# Patient Record
Sex: FEMALE | Race: BLACK OR AFRICAN AMERICAN | Employment: FULL TIME
[De-identification: names, ages, dates, MRNs, and addresses within clinical notes are randomized per-mention and may not be internally consistent; named-entity substitution may affect disease eponyms.]

---

## 2023-06-18 ENCOUNTER — HOSPITAL ENCOUNTER (EMERGENCY)
Facility: HOSPITAL | Age: 33
Discharge: HOME | End: 2023-06-18
Payer: MEDICAID

## 2023-06-18 VITALS
BODY MASS INDEX: 22.15 KG/M2 | SYSTOLIC BLOOD PRESSURE: 115 MMHG | HEART RATE: 75 BPM | TEMPERATURE: 98.2 F | OXYGEN SATURATION: 100 % | HEIGHT: 63 IN | WEIGHT: 125 LBS | RESPIRATION RATE: 16 BRPM | DIASTOLIC BLOOD PRESSURE: 70 MMHG

## 2023-06-18 DIAGNOSIS — M79.10 MUSCLE PAIN: Primary | ICD-10-CM

## 2023-06-18 LAB
ALBUMIN SERPL-MCNC: 4.3 G/DL (ref 3.5–5)
ALBUMIN/GLOB SERPL: 1.3 (ref 1.1–2.2)
ALP SERPL-CCNC: 53 U/L (ref 45–117)
ALT SERPL-CCNC: 25 U/L (ref 12–78)
ANION GAP SERPL CALC-SCNC: 11 MMOL/L (ref 5–15)
APPEARANCE UR: CLEAR
AST SERPL W P-5'-P-CCNC: 16 U/L (ref 15–37)
BACTERIA URNS QL MICRO: NEGATIVE /HPF
BASOPHILS # BLD: 0 K/UL (ref 0–0.1)
BASOPHILS NFR BLD: 1 % (ref 0–1)
BILIRUB SERPL-MCNC: 0.8 MG/DL (ref 0.2–1)
BILIRUB UR QL: NEGATIVE
BUN SERPL-MCNC: 9 MG/DL (ref 6–20)
BUN/CREAT SERPL: 11 (ref 12–20)
CA-I BLD-MCNC: 9.4 MG/DL (ref 8.5–10.1)
CHLORIDE SERPL-SCNC: 103 MMOL/L (ref 97–108)
CK SERPL-CCNC: 84 U/L (ref 26–192)
CO2 SERPL-SCNC: 26 MMOL/L (ref 21–32)
COLOR UR: YELLOW
CREAT SERPL-MCNC: 0.8 MG/DL (ref 0.55–1.02)
DIFFERENTIAL METHOD BLD: ABNORMAL
EOSINOPHIL # BLD: 0.2 K/UL (ref 0–0.4)
EOSINOPHIL NFR BLD: 3 % (ref 0–7)
EPITH CASTS URNS QL MICRO: ABNORMAL /LPF
ERYTHROCYTE [DISTWIDTH] IN BLOOD BY AUTOMATED COUNT: 12.2 % (ref 11.5–14.5)
GLOBULIN SER CALC-MCNC: 3.3 G/DL (ref 2–4)
GLUCOSE SERPL-MCNC: 97 MG/DL (ref 65–100)
GLUCOSE UR STRIP.AUTO-MCNC: NEGATIVE MG/DL
HCG UR QL: NEGATIVE
HCT VFR BLD AUTO: 35.6 % (ref 35–47)
HGB BLD-MCNC: 12 G/DL (ref 11.5–16)
HGB UR QL STRIP: ABNORMAL
IMM GRANULOCYTES # BLD AUTO: 0 K/UL (ref 0–0.04)
IMM GRANULOCYTES NFR BLD AUTO: 0 % (ref 0–0.5)
KETONES UR QL STRIP.AUTO: 15 MG/DL
LEUKOCYTE ESTERASE UR QL STRIP.AUTO: ABNORMAL
LYMPHOCYTES # BLD: 2.9 K/UL (ref 0.8–3.5)
LYMPHOCYTES NFR BLD: 60 % (ref 12–49)
MAGNESIUM SERPL-MCNC: 1.8 MG/DL (ref 1.6–2.4)
MCH RBC QN AUTO: 31.7 PG (ref 26–34)
MCHC RBC AUTO-ENTMCNC: 33.7 G/DL (ref 30–36.5)
MCV RBC AUTO: 94.2 FL (ref 80–99)
MONOCYTES # BLD: 0.3 K/UL (ref 0–1)
MONOCYTES NFR BLD: 5 % (ref 5–13)
MUCOUS THREADS URNS QL MICRO: ABNORMAL /LPF
NEUTS SEG # BLD: 1.5 K/UL (ref 1.8–8)
NEUTS SEG NFR BLD: 31 % (ref 32–75)
NITRITE UR QL STRIP.AUTO: NEGATIVE
PH UR STRIP: 6.5 (ref 5–8)
PLATELET # BLD AUTO: 272 K/UL (ref 150–400)
PMV BLD AUTO: 9.6 FL (ref 8.9–12.9)
POTASSIUM SERPL-SCNC: 3.1 MMOL/L (ref 3.5–5.1)
PROT SERPL-MCNC: 7.6 G/DL (ref 6.4–8.2)
PROT UR STRIP-MCNC: NEGATIVE MG/DL
RBC # BLD AUTO: 3.78 M/UL (ref 3.8–5.2)
RBC #/AREA URNS HPF: ABNORMAL /HPF (ref 0–5)
SODIUM SERPL-SCNC: 140 MMOL/L (ref 136–145)
SP GR UR REFRACTOMETRY: 1.01 (ref 1–1.03)
URINE CULTURE IF INDICATED: ABNORMAL
UROBILINOGEN UR QL STRIP.AUTO: 0.1 EU/DL (ref 0.2–1)
WBC # BLD AUTO: 4.8 K/UL (ref 3.6–11)
WBC URNS QL MICRO: ABNORMAL /HPF (ref 0–4)

## 2023-06-18 PROCEDURE — 85025 COMPLETE CBC W/AUTO DIFF WBC: CPT

## 2023-06-18 PROCEDURE — 83735 ASSAY OF MAGNESIUM: CPT

## 2023-06-18 PROCEDURE — 6370000000 HC RX 637 (ALT 250 FOR IP): Performed by: NURSE PRACTITIONER

## 2023-06-18 PROCEDURE — 80053 COMPREHEN METABOLIC PANEL: CPT

## 2023-06-18 PROCEDURE — 82550 ASSAY OF CK (CPK): CPT

## 2023-06-18 PROCEDURE — 2580000003 HC RX 258: Performed by: NURSE PRACTITIONER

## 2023-06-18 PROCEDURE — 99284 EMERGENCY DEPT VISIT MOD MDM: CPT

## 2023-06-18 PROCEDURE — 81025 URINE PREGNANCY TEST: CPT

## 2023-06-18 PROCEDURE — 81001 URINALYSIS AUTO W/SCOPE: CPT

## 2023-06-18 PROCEDURE — 93005 ELECTROCARDIOGRAM TRACING: CPT | Performed by: NURSE PRACTITIONER

## 2023-06-18 PROCEDURE — 36415 COLL VENOUS BLD VENIPUNCTURE: CPT

## 2023-06-18 RX ORDER — 0.9 % SODIUM CHLORIDE 0.9 %
1000 INTRAVENOUS SOLUTION INTRAVENOUS
Status: COMPLETED | OUTPATIENT
Start: 2023-06-18 | End: 2023-06-18

## 2023-06-18 RX ORDER — POTASSIUM CHLORIDE 1.5 G/1.77G
40 POWDER, FOR SOLUTION ORAL ONCE
Status: COMPLETED | OUTPATIENT
Start: 2023-06-18 | End: 2023-06-18

## 2023-06-18 RX ADMIN — SODIUM CHLORIDE 1000 ML: 9 INJECTION, SOLUTION INTRAVENOUS at 16:15

## 2023-06-18 RX ADMIN — SODIUM CHLORIDE 1000 ML: 9 INJECTION, SOLUTION INTRAVENOUS at 17:04

## 2023-06-18 RX ADMIN — POTASSIUM CHLORIDE 40 MEQ: 1.5 FOR SOLUTION ORAL at 17:04

## 2023-06-18 ASSESSMENT — PAIN SCALES - GENERAL: PAINLEVEL_OUTOF10: 7

## 2023-06-18 ASSESSMENT — PAIN - FUNCTIONAL ASSESSMENT: PAIN_FUNCTIONAL_ASSESSMENT: 0-10

## 2023-06-19 LAB
EKG ATRIAL RATE: 93 BPM
EKG DIAGNOSIS: NORMAL
EKG P AXIS: 57 DEGREES
EKG P-R INTERVAL: 172 MS
EKG Q-T INTERVAL: 364 MS
EKG QRS DURATION: 72 MS
EKG QTC CALCULATION (BAZETT): 452 MS
EKG R AXIS: 65 DEGREES
EKG T AXIS: 12 DEGREES
EKG VENTRICULAR RATE: 93 BPM

## 2023-06-29 ENCOUNTER — TELEPHONE (OUTPATIENT)
Age: 33
End: 2023-06-29

## 2024-01-11 ENCOUNTER — HOSPITAL ENCOUNTER (EMERGENCY)
Facility: HOSPITAL | Age: 34
Discharge: HOME | End: 2024-01-11
Payer: MEDICAID

## 2024-01-11 ENCOUNTER — APPOINTMENT (OUTPATIENT)
Facility: HOSPITAL | Age: 34
End: 2024-01-11
Payer: MEDICAID

## 2024-01-11 VITALS
TEMPERATURE: 98.9 F | WEIGHT: 138.4 LBS | SYSTOLIC BLOOD PRESSURE: 126 MMHG | HEIGHT: 63 IN | OXYGEN SATURATION: 98 % | DIASTOLIC BLOOD PRESSURE: 71 MMHG | HEART RATE: 89 BPM | BODY MASS INDEX: 24.52 KG/M2

## 2024-01-11 DIAGNOSIS — M62.831 MUSCLE SPASM OF LEFT CALF: Primary | ICD-10-CM

## 2024-01-11 DIAGNOSIS — Z86.16 HISTORY OF COVID-19: ICD-10-CM

## 2024-01-11 DIAGNOSIS — R07.89 CHEST WALL PAIN: ICD-10-CM

## 2024-01-11 LAB
ALBUMIN SERPL-MCNC: 4.1 G/DL (ref 3.5–5)
ALBUMIN/GLOB SERPL: 1.2 (ref 1.1–2.2)
ALP SERPL-CCNC: 47 U/L (ref 45–117)
ALT SERPL-CCNC: 28 U/L (ref 12–78)
ANION GAP SERPL CALC-SCNC: 8 MMOL/L (ref 5–15)
AST SERPL W P-5'-P-CCNC: 14 U/L (ref 15–37)
BASOPHILS # BLD: 0.1 K/UL (ref 0–0.1)
BASOPHILS NFR BLD: 1 % (ref 0–1)
BILIRUB SERPL-MCNC: 0.4 MG/DL (ref 0.2–1)
BUN SERPL-MCNC: 15 MG/DL (ref 6–20)
BUN/CREAT SERPL: 21 (ref 12–20)
CA-I BLD-MCNC: 9.4 MG/DL (ref 8.5–10.1)
CHLORIDE SERPL-SCNC: 102 MMOL/L (ref 97–108)
CK SERPL-CCNC: 84 U/L (ref 26–192)
CO2 SERPL-SCNC: 29 MMOL/L (ref 21–32)
CREAT SERPL-MCNC: 0.72 MG/DL (ref 0.55–1.02)
D DIMER PPP FEU-MCNC: 0.58 MG/L FEU (ref 0.19–0.5)
DIFFERENTIAL METHOD BLD: ABNORMAL
EKG ATRIAL RATE: 68 BPM
EKG DIAGNOSIS: NORMAL
EKG P AXIS: 66 DEGREES
EKG P-R INTERVAL: 188 MS
EKG Q-T INTERVAL: 392 MS
EKG QRS DURATION: 78 MS
EKG QTC CALCULATION (BAZETT): 416 MS
EKG R AXIS: 57 DEGREES
EKG T AXIS: 18 DEGREES
EKG VENTRICULAR RATE: 68 BPM
EOSINOPHIL # BLD: 0.2 K/UL (ref 0–0.4)
EOSINOPHIL NFR BLD: 4 % (ref 0–7)
ERYTHROCYTE [DISTWIDTH] IN BLOOD BY AUTOMATED COUNT: 12.2 % (ref 11.5–14.5)
GLOBULIN SER CALC-MCNC: 3.3 G/DL (ref 2–4)
GLUCOSE SERPL-MCNC: 89 MG/DL (ref 65–100)
HCT VFR BLD AUTO: 36.8 % (ref 35–47)
HGB BLD-MCNC: 12.3 G/DL (ref 11.5–16)
IMM GRANULOCYTES # BLD AUTO: 0 K/UL (ref 0–0.04)
IMM GRANULOCYTES NFR BLD AUTO: 0 % (ref 0–0.5)
LYMPHOCYTES # BLD: 2.8 K/UL (ref 0.8–3.5)
LYMPHOCYTES NFR BLD: 57 % (ref 12–49)
MAGNESIUM SERPL-MCNC: 1.7 MG/DL (ref 1.6–2.4)
MCH RBC QN AUTO: 31.6 PG (ref 26–34)
MCHC RBC AUTO-ENTMCNC: 33.4 G/DL (ref 30–36.5)
MCV RBC AUTO: 94.6 FL (ref 80–99)
MONOCYTES # BLD: 0.2 K/UL (ref 0–1)
MONOCYTES NFR BLD: 4 % (ref 5–13)
NEUTS SEG # BLD: 1.6 K/UL (ref 1.8–8)
NEUTS SEG NFR BLD: 34 % (ref 32–75)
PLATELET # BLD AUTO: 303 K/UL (ref 150–400)
PMV BLD AUTO: 9.2 FL (ref 8.9–12.9)
POTASSIUM SERPL-SCNC: 3.9 MMOL/L (ref 3.5–5.1)
PROT SERPL-MCNC: 7.4 G/DL (ref 6.4–8.2)
RBC # BLD AUTO: 3.89 M/UL (ref 3.8–5.2)
SODIUM SERPL-SCNC: 139 MMOL/L (ref 136–145)
TROPONIN I SERPL HS-MCNC: 6 NG/L (ref 0–51)
WBC # BLD AUTO: 4.8 K/UL (ref 3.6–11)

## 2024-01-11 PROCEDURE — 85025 COMPLETE CBC W/AUTO DIFF WBC: CPT

## 2024-01-11 PROCEDURE — 83735 ASSAY OF MAGNESIUM: CPT

## 2024-01-11 PROCEDURE — 93005 ELECTROCARDIOGRAM TRACING: CPT | Performed by: EMERGENCY MEDICINE

## 2024-01-11 PROCEDURE — 6360000004 HC RX CONTRAST MEDICATION

## 2024-01-11 PROCEDURE — 84484 ASSAY OF TROPONIN QUANT: CPT

## 2024-01-11 PROCEDURE — 80053 COMPREHEN METABOLIC PANEL: CPT

## 2024-01-11 PROCEDURE — 85379 FIBRIN DEGRADATION QUANT: CPT

## 2024-01-11 PROCEDURE — 99285 EMERGENCY DEPT VISIT HI MDM: CPT

## 2024-01-11 PROCEDURE — 82550 ASSAY OF CK (CPK): CPT

## 2024-01-11 PROCEDURE — 36415 COLL VENOUS BLD VENIPUNCTURE: CPT

## 2024-01-11 PROCEDURE — 71275 CT ANGIOGRAPHY CHEST: CPT

## 2024-01-11 RX ADMIN — IOPAMIDOL 100 ML: 755 INJECTION, SOLUTION INTRAVENOUS at 20:11

## 2024-01-11 ASSESSMENT — PAIN DESCRIPTION - ORIENTATION: ORIENTATION: LEFT

## 2024-01-11 ASSESSMENT — PAIN DESCRIPTION - LOCATION: LOCATION: SHOULDER;LEG

## 2024-01-11 ASSESSMENT — PAIN - FUNCTIONAL ASSESSMENT: PAIN_FUNCTIONAL_ASSESSMENT: 0-10

## 2024-01-11 ASSESSMENT — PAIN SCALES - GENERAL: PAINLEVEL_OUTOF10: 6

## 2024-01-11 NOTE — ED TRIAGE NOTES
Dx with covid last tues, is feeling better from that but since then has had left leg cramping, chest pain and back pain. Works at [employer] [facility] and had bloodwork done today, d-dimer was 0.59

## 2024-01-11 NOTE — ED PROVIDER NOTES
[facility] EMERGENCY DEPT  EMERGENCY DEPARTMENT HISTORY AND PHYSICAL EXAM      Date: 2024  Patient Name: Landy Kim  MRN: [id number]  YOB: 1990  Date of evaluation: 2024  Provider: Tim Molina PA-C   Note Started: 6:36 PM EST 24    HISTORY OF PRESENT ILLNESS     Chief Complaint   Patient presents with    Back Pain    Chest Pain    Leg Pain       History Provided By: Patient    HPI: Landy Kim is a 33 y.o. female past medical history past medical history listed below presents from today with complaint of chest pain starting 2 days ago.  Reports having COVID last week.  Noticed pain in the left lower extremity.  Reports family history of DVT.  Comes in today for further evaluation.  Reports intermittent palpitations over the past couple days.  Denies any abdominal pain, nausea, vomiting.  Denies any shortness of breath or wheezing.  Denies congestion or sore throat.  Reports that he did not manage states that it was elevated.    PAST MEDICAL HISTORY   Past Medical History:  Past Medical History:   Diagnosis Date    Arnold-Chiari malformation (HCC)     Endometriosis     GERD (gastroesophageal reflux disease)     Hashimoto's disease     KEYUR (obstructive sleep apnea)     Scoliosis     Seasonal allergic rhinitis        Past Surgical History:  Past Surgical History:   Procedure Laterality Date    APPENDECTOMY       SECTION      TONSILLECTOMY         Family History:  Family History   Problem Relation Age of Onset    Lupus Mother     Cancer Other         father's uncles - stomach cancer       Social History:  Social History     Tobacco Use    Smoking status: Former     Current packs/day: 0.25     Types: Cigarettes    Smokeless tobacco: Never   Substance Use Topics    Alcohol use: Not Currently    Drug use: Never       Allergies:  Allergies   Allergen Reactions    Barley Grass Other (See Comments)     Swelling     Shellfish Allergy      Other reaction(s): Unknown (comments)    Soy

## 2024-01-12 ENCOUNTER — HOSPITAL ENCOUNTER (EMERGENCY)
Facility: HOSPITAL | Age: 34
Discharge: HOME | End: 2024-01-12
Payer: MEDICAID

## 2024-01-12 ENCOUNTER — APPOINTMENT (OUTPATIENT)
Facility: HOSPITAL | Age: 34
End: 2024-01-12
Attending: STUDENT IN AN ORGANIZED HEALTH CARE EDUCATION/TRAINING PROGRAM
Payer: MEDICAID

## 2024-01-12 VITALS
OXYGEN SATURATION: 100 % | SYSTOLIC BLOOD PRESSURE: 131 MMHG | TEMPERATURE: 98.8 F | RESPIRATION RATE: 18 BRPM | DIASTOLIC BLOOD PRESSURE: 76 MMHG | HEART RATE: 72 BPM

## 2024-01-12 DIAGNOSIS — M79.606 LEG PAIN: Primary | ICD-10-CM

## 2024-01-12 DIAGNOSIS — M79.605 BILATERAL LEG PAIN: ICD-10-CM

## 2024-01-12 DIAGNOSIS — M79.604 BILATERAL LEG PAIN: ICD-10-CM

## 2024-01-12 PROCEDURE — 99284 EMERGENCY DEPT VISIT MOD MDM: CPT

## 2024-01-12 PROCEDURE — 93970 EXTREMITY STUDY: CPT

## 2024-01-12 RX ORDER — METHOCARBAMOL 750 MG/1
750 TABLET, FILM COATED ORAL
Qty: 10 TABLET | Refills: 0 | Status: SHIPPED | OUTPATIENT
Start: 2024-01-12 | End: 2024-01-22

## 2024-01-12 ASSESSMENT — LIFESTYLE VARIABLES
HOW MANY STANDARD DRINKS CONTAINING ALCOHOL DO YOU HAVE ON A TYPICAL DAY: PATIENT DOES NOT DRINK
HOW OFTEN DO YOU HAVE A DRINK CONTAINING ALCOHOL: NEVER

## 2024-01-12 ASSESSMENT — PAIN SCALES - GENERAL: PAINLEVEL_OUTOF10: 5

## 2024-01-12 NOTE — ED TRIAGE NOTES
Pt presents for leg pain that is worse when her legs are elevated. Seen at [facility] last night with positive DDimer and negative CTA, advised to f/u here for duplex. Positive for covid x2wk ago

## 2024-01-12 NOTE — DISCHARGE INSTRUCTIONS
Thank you!  Thank you for allowing me to care for you in the emergency department. It is my goal to provide you with excellent care. If you have not received excellent quality care, please ask to speak to the nurse manager. Please fill out the survey that will come to you by mail or email since we listen to your feedback!     Below you will find a list of your tests from today's visit.  Should you have any questions, please do not hesitate to call the emergency department.    Labs  Recent Results (from the past 12 hour(s))   CBC with Auto Differential    Collection Time: 01/11/24  6:30 PM   Result Value Ref Range    WBC 4.8 3.6 - 11.0 K/uL    RBC 3.89 3.80 - 5.20 M/uL    Hemoglobin 12.3 11.5 - 16.0 g/dL    Hematocrit 36.8 35.0 - 47.0 %    MCV 94.6 80.0 - 99.0 FL    MCH 31.6 26.0 - 34.0 PG    MCHC 33.4 30.0 - 36.5 g/dL    RDW 12.2 11.5 - 14.5 %    Platelets 303 150 - 400 K/uL    MPV 9.2 8.9 - 12.9 FL    Neutrophils % 34 32 - 75 %    Lymphocytes % 57 (H) 12 - 49 %    Monocytes % 4 (L) 5 - 13 %    Eosinophils % 4 0 - 7 %    Basophils % 1 0 - 1 %    Immature Granulocytes 0 0.0 - 0.5 %    Neutrophils Absolute 1.6 (L) 1.8 - 8.0 K/UL    Lymphocytes Absolute 2.8 0.8 - 3.5 K/UL    Monocytes Absolute 0.2 0.0 - 1.0 K/UL    Eosinophils Absolute 0.2 0.0 - 0.4 K/UL    Basophils Absolute 0.1 0.0 - 0.1 K/UL    Absolute Immature Granulocyte 0.0 0.00 - 0.04 K/UL    Differential Type AUTOMATED     Comprehensive Metabolic Panel    Collection Time: 01/11/24  6:30 PM   Result Value Ref Range    Sodium 139 136 - 145 mmol/L    Potassium 3.9 3.5 - 5.1 mmol/L    Chloride 102 97 - 108 mmol/L    CO2 29 21 - 32 mmol/L    Anion Gap 8 5 - 15 mmol/L    Glucose 89 65 - 100 mg/dL    BUN 15 6 - 20 mg/dL    Creatinine 0.72 0.55 - 1.02 mg/dL    Bun/Cre Ratio 21 (H) 12 - 20      Est, Glom Filt Rate >60 >60 ml/min/1.73m2    Calcium 9.4 8.5 - 10.1 mg/dL    Total Bilirubin 0.4 0.2 - 1.0 mg/dL    AST 14 (L) 15 - 37 U/L    ALT 28 12 - 78 U/L    Alk

## 2024-01-13 NOTE — ED PROVIDER NOTES
[facility] EMERGENCY DEPT  EMERGENCY DEPARTMENT HISTORY AND PHYSICAL EXAM      Date: 2024  Patient Name: Landy Kim  MRN: [id number]  YOB: 1990  Date of evaluation: 2024  Provider: Joelle Conroy PA-C   Note Started: 8:20 PM EST 24    HISTORY OF PRESENT ILLNESS     Chief Complaint   Patient presents with    Leg Pain    Positive For Covid-19       History Provided By: Patient    HPI: Landy Kim is a 33 y.o. female with past medical history listed below, presents for bilateral lower leg pain after positive COVID test. Patient states that about 2 weeks ago tested positive for COVID.  Majority of her symptoms resolved, however has had some bilateral lower leg pain.  Patient was seen yesterday at [facility] emergency department where she had full workup due to elevated D-dimer, back and chest pain which has now resolved.  CTA was negative.  She was told that if she continued to have leg pain that she should come to [facility] for duplex. Denies any fevers, chills, shortness of breath, difficulty breathing, nausea/vomiting, constipation/diarrhea, abdominal pain, rash, night sweats, or chest pain.     PAST MEDICAL HISTORY   Past Medical History:  Past Medical History:   Diagnosis Date    Arnold-Chiari malformation (HCC)     Endometriosis     GERD (gastroesophageal reflux disease)     Hashimoto's disease     KEYUR (obstructive sleep apnea)     Scoliosis     Seasonal allergic rhinitis        Past Surgical History:  Past Surgical History:   Procedure Laterality Date    APPENDECTOMY       SECTION      TONSILLECTOMY         Family History:  Family History   Problem Relation Age of Onset    Lupus Mother     Cancer Other         father's uncles - stomach cancer       Social History:  Social History     Tobacco Use    Smoking status: Former     Current packs/day: 0.25     Types: Cigarettes    Smokeless tobacco: Never   Substance Use Topics    Alcohol use: Not Currently    Drug use: Never  is stable for discharge home. The signs, symptoms, diagnosis, and discharge instructions have been discussed, understanding conveyed, and agreed upon. The patient is to follow up as recommended or return to ER should their symptoms worsen.      PATIENT REFERRED TO:  Travis Brandt MD  [address] [address] [phone]              DISCHARGE MEDICATIONS:     Medication List        START taking these medications      methocarbamol 750 MG tablet  Commonly known as: Robaxin-750  Take 1 tablet by mouth nightly for 10 days            ASK your doctor about these medications      acetaminophen 325 MG tablet  Commonly known as: TYLENOL     ibuprofen 600 MG tablet  Commonly known as: ADVIL;MOTRIN     montelukast 10 MG tablet  Commonly known as: SINGULAIR     omeprazole 20 MG delayed release capsule  Commonly known as: PRILOSEC     ondansetron 4 MG disintegrating tablet  Commonly known as: ZOFRAN-ODT     * SUMAtriptan 100 MG tablet  Commonly known as: IMITREX     * SUMAtriptan Succinate Refill 6 MG/0.5ML Soct     ZyrTEC Allergy 10 MG Caps  Generic drug: Cetirizine HCl           * This list has 2 medication(s) that are the same as other medications prescribed for you. Read the directions carefully, and ask your doctor or other care provider to review them with you.                   Where to Get Your Medications        These medications were sent to [address] [phone] - F [phone]  [address] [place] [id number]      Phone: [phone]   methocarbamol 750 MG tablet           DISCONTINUED MEDICATIONS:  Discharge Medication List as of 1/12/2024  6:32 PM          I am the Primary Clinician of Record: Joelle Conroy PA-C (electronically signed)    (Please note that parts of this dictation were completed with voice recognition software. Quite often unanticipated grammatical, syntax, homophones, and other interpretive errors

## 2024-04-29 ENCOUNTER — APPOINTMENT (OUTPATIENT)
Facility: HOSPITAL | Age: 34
End: 2024-04-29
Payer: MEDICAID

## 2024-04-29 ENCOUNTER — HOSPITAL ENCOUNTER (EMERGENCY)
Facility: HOSPITAL | Age: 34
Discharge: HOME | End: 2024-04-29
Attending: EMERGENCY MEDICINE
Payer: MEDICAID

## 2024-04-29 VITALS
HEART RATE: 93 BPM | BODY MASS INDEX: 26.22 KG/M2 | TEMPERATURE: 98.2 F | OXYGEN SATURATION: 100 % | DIASTOLIC BLOOD PRESSURE: 64 MMHG | WEIGHT: 148 LBS | RESPIRATION RATE: 16 BRPM | HEIGHT: 63 IN | SYSTOLIC BLOOD PRESSURE: 121 MMHG

## 2024-04-29 DIAGNOSIS — Z23 NEED FOR TETANUS BOOSTER: ICD-10-CM

## 2024-04-29 DIAGNOSIS — S61.411A LACERATION OF RIGHT HAND WITHOUT FOREIGN BODY, INITIAL ENCOUNTER: Primary | ICD-10-CM

## 2024-04-29 PROCEDURE — 90471 IMMUNIZATION ADMIN: CPT

## 2024-04-29 PROCEDURE — 99284 EMERGENCY DEPT VISIT MOD MDM: CPT

## 2024-04-29 PROCEDURE — 12001 RPR S/N/AX/GEN/TRNK 2.5CM/<: CPT

## 2024-04-29 PROCEDURE — 6370000000 HC RX 637 (ALT 250 FOR IP)

## 2024-04-29 PROCEDURE — 73130 X-RAY EXAM OF HAND: CPT

## 2024-04-29 PROCEDURE — 6360000002 HC RX W HCPCS

## 2024-04-29 PROCEDURE — 90714 TD VACC NO PRESV 7 YRS+ IM: CPT

## 2024-04-29 RX ORDER — PANTOPRAZOLE SODIUM 40 MG/1
TABLET, DELAYED RELEASE ORAL
COMMUNITY
Start: 2023-09-12

## 2024-04-29 RX ORDER — TETANUS AND DIPHTHERIA TOXOIDS ADSORBED 2; 2 [LF]/.5ML; [LF]/.5ML
0.5 INJECTION INTRAMUSCULAR ONCE
Status: COMPLETED | OUTPATIENT
Start: 2024-04-29 | End: 2024-04-29

## 2024-04-29 RX ORDER — IBUPROFEN 800 MG/1
800 TABLET ORAL
Status: COMPLETED | OUTPATIENT
Start: 2024-04-29 | End: 2024-04-29

## 2024-04-29 RX ORDER — FLUOXETINE 10 MG/1
CAPSULE ORAL
COMMUNITY
Start: 2023-06-29

## 2024-04-29 RX ORDER — LEVOTHYROXINE SODIUM 0.03 MG/1
TABLET ORAL
COMMUNITY
Start: 2024-04-10

## 2024-04-29 RX ADMIN — TETANUS AND DIPHTHERIA TOXOIDS ADSORBED 0.5 ML: 2; 2 INJECTION INTRAMUSCULAR at 20:28

## 2024-04-29 RX ADMIN — IBUPROFEN 800 MG: 800 TABLET, FILM COATED ORAL at 20:28

## 2024-04-29 ASSESSMENT — PAIN DESCRIPTION - ORIENTATION: ORIENTATION: RIGHT

## 2024-04-29 ASSESSMENT — PAIN SCALES - GENERAL: PAINLEVEL_OUTOF10: 10

## 2024-04-29 ASSESSMENT — PAIN DESCRIPTION - LOCATION: LOCATION: HAND

## 2024-04-29 ASSESSMENT — PAIN - FUNCTIONAL ASSESSMENT: PAIN_FUNCTIONAL_ASSESSMENT: 0-10

## 2024-04-30 NOTE — DISCHARGE INSTRUCTIONS
Thank you!  Thank you for allowing me to care for you in the emergency department. It is my goal to provide you with excellent care.  Please fill out the survey that will come to you by mail or email since we listen to your feedback!     Below you will find a list of your tests from today's visit.  Should you have any questions, please do not hesitate to call the emergency department.    Labs  No results found for this or any previous visit (from the past 12 hour(s)).    Radiologic Studies  XR HAND RIGHT (MIN 3 VIEWS)   Final Result   No acute bony abnormalities.           ------------------------------------------------------------------------------------------------------------  The exam and treatment you received in the Emergency Department were for an urgent problem and are not intended as complete care. It is important that you follow-up with a doctor, nurse practitioner, or physician assistant to:  (1) confirm your diagnosis,  (2) re-evaluation of changes in your illness and treatment, and (3) for ongoing care. Please take your discharge instructions with you when you go to your follow-up appointment.     If you have any problem arranging a follow-up appointment, contact the Emergency Department.  If your symptoms become worse or you do not improve as expected and you are unable to reach your health care provider, please return to the Emergency Department. We are available 24 hours a day.     If a prescription has been provided, please have it filled as soon as possible to prevent a delay in treatment. If you have any questions or reservations about taking the medication due to side effects or interactions with other medications, please call your primary care provider or contact the ER.

## 2024-04-30 NOTE — ED PROVIDER NOTES
[facility] EMERGENCY DEPT  EMERGENCY DEPARTMENT HISTORY AND PHYSICAL EXAM      Date: 2024  Patient Name: Landy Kim  MRN: [id number]  YOB: 1990  Date of evaluation: 2024  Provider: Tim Molina PA-C   Note Started: 10:24 PM EDT 24    HISTORY OF PRESENT ILLNESS     Chief Complaint   Patient presents with    Laceration       History Provided By: Patient    HPI: Landy Kim is a 34 y.o. female past medical history listed below presents emerged from today with complaint of laceration starting prior to arrival.  States he was washing dishes and a glass broke cutting side of her right hand.  Comes in today for further evaluation.  Reports controlling bleeding.  Tetanus was done 5 to 6 years ago per patient.  Denies any other trauma or injury.    PAST MEDICAL HISTORY   Past Medical History:  Past Medical History:   Diagnosis Date    Arnold-Chiari malformation (HCC)     Endometriosis     GERD (gastroesophageal reflux disease)     Hashimoto's disease     KEYUR (obstructive sleep apnea)     Scoliosis     Seasonal allergic rhinitis        Past Surgical History:  Past Surgical History:   Procedure Laterality Date    APPENDECTOMY       SECTION      TONSILLECTOMY         Family History:  Family History   Problem Relation Age of Onset    Lupus Mother     Cancer Other         father's uncles - stomach cancer       Social History:  Social History     Tobacco Use    Smoking status: Former     Current packs/day: 0.25     Types: Cigarettes    Smokeless tobacco: Never   Substance Use Topics    Alcohol use: Not Currently    Drug use: Never       Allergies:  Allergies   Allergen Reactions    Barley Grass Other (See Comments)     Swelling     Shellfish Allergy      Other reaction(s): Unknown (comments)    Soy Other (See Comments)     Swelling    Wheat Other (See Comments)     Swelling       PCP: Travis Brandt MD    Current Meds:   No current facility-administered medications for this encounter.

## 2024-08-08 ENCOUNTER — HOSPITAL ENCOUNTER (EMERGENCY)
Facility: HOSPITAL | Age: 34
Discharge: HOME | End: 2024-08-08
Payer: MEDICAID

## 2024-08-08 VITALS
HEART RATE: 80 BPM | RESPIRATION RATE: 16 BRPM | SYSTOLIC BLOOD PRESSURE: 111 MMHG | WEIGHT: 149 LBS | DIASTOLIC BLOOD PRESSURE: 64 MMHG | BODY MASS INDEX: 26.4 KG/M2 | OXYGEN SATURATION: 100 % | HEIGHT: 63 IN | TEMPERATURE: 99 F

## 2024-08-08 DIAGNOSIS — U07.1 COVID-19: Primary | ICD-10-CM

## 2024-08-08 LAB
FLUAV RNA SPEC QL NAA+PROBE: NOT DETECTED
FLUBV RNA SPEC QL NAA+PROBE: NOT DETECTED
SARS-COV-2 RNA RESP QL NAA+PROBE: DETECTED

## 2024-08-08 PROCEDURE — 99283 EMERGENCY DEPT VISIT LOW MDM: CPT

## 2024-08-08 PROCEDURE — 87636 SARSCOV2 & INF A&B AMP PRB: CPT

## 2024-08-08 ASSESSMENT — PAIN - FUNCTIONAL ASSESSMENT: PAIN_FUNCTIONAL_ASSESSMENT: 0-10

## 2024-08-08 ASSESSMENT — PAIN SCALES - GENERAL: PAINLEVEL_OUTOF10: 4

## 2024-08-09 NOTE — DISCHARGE INSTRUCTIONS
Thank you for choosing our Emergency Department for your care.  It is our privilege to care for you in your time of need.  In the next several days, you may receive a survey via email or mailed to your home about your experience with our team.  We would greatly appreciate you taking a few minutes to complete the survey, as we use this information to learn what we have done well and what we could be doing better. Thank you for trusting us with your care!    Below you will find a list of your tests from today's visit.   Labs  Recent Results (from the past 12 hour(s))   COVID-19 & Influenza Combo    Collection Time: 08/08/24  8:15 PM    Specimen: Nasopharyngeal   Result Value Ref Range    SARS-CoV-2, PCR DETECTED (A) Not Detected      Rapid Influenza A By PCR Not Detected Not Detected      Rapid Influenza B By PCR Not Detected Not Detected         Radiologic Studies  No orders to display     ------------------------------------------------------------------------------------------------------------  The evaluation and treatment you received in the Emergency Department were for an urgent problem. It is important that you follow-up with a doctor, nurse practitioner, or physician assistant to:  (1) confirm your diagnosis,  (2) re-evaluation of changes in your illness and treatment, and (3) for ongoing care. Please take your discharge instructions with you when you go to your follow-up appointment.     If you have any problem arranging a follow-up appointment, contact us!  If your symptoms become worse or you do not improve as expected, please return to us. We are available 24 hours a day.     If a prescription has been provided, please fill it as soon as possible to prevent a delay in treatment. If you have any questions or reservations about taking the medication due to side effects or interactions with other medications, please call your primary care provider or contact us directly.  Again, THANK YOU for choosing us

## 2024-08-09 NOTE — ED TRIAGE NOTES
States she wants a COVID test due to exposure twice this week.  States she's having stomach cramps, diarrhea, sore throat, and a headache

## 2024-08-09 NOTE — ED PROVIDER NOTES
(149 lb)   Height: 1.6 m (5' 3\")        ED COURSE       SEPSIS Reassessment: Sepsis reassessment not applicable    Clinical Management Tools:  COVID-19: MEDICAL DECISION MAKING:   I considered, but did not perform, additional testing such a chest CT, VQ scan, or venous doppler ultrasonography, as well as admission or transfer to a higher level of care.     I utilized an evidence-based risk rating tool (CMT) along with my training and experience to weigh the risk of discharge against the risks of further testing, imaging, or hospitalization. At this time I estimate the risks of additional testing, imaging, or hospitalization to be equal to or greater than the risk of discharge. At this time, I estimate the risks of additional testing, imaging, or hospitalization (in the case of discharge) to be equal to or greater than the risk of discharge.. [id number]    SHARED DECISION MAKING:   I discussed my risk assessment with the patient. The patient understands and consents to the risk of disposition/plan, as well as the risk of uncertainty in estimating outcomes. [id number]          Patient was given the following medications:  Medications - No data to display    CONSULTS: See ED Course/MDM for further details.  None     Social Determinants affecting Diagnosis/Treatment: None    Smoking Cessation: Not Applicable    PROCEDURES   Unless otherwise noted above, none  Procedures      CRITICAL CARE TIME   Patient does not meet Critical Care Time, 0 minutes    ED IMPRESSION     1. COVID-19          DISPOSITION/PLAN   DISPOSITION Decision To Discharge 08/08/2024 09:23:22 PM    Discharge Note: The patient is stable for discharge home. The signs, symptoms, diagnosis, and discharge instructions have been discussed, understanding conveyed, and agreed upon. The patient is to follow up as recommended or return to ER should their symptoms worsen.      PATIENT REFERRED TO:  [facility] EMERGENCY DEPARTMENT  [address]

## 2024-08-13 ENCOUNTER — APPOINTMENT (OUTPATIENT)
Facility: HOSPITAL | Age: 34
End: 2024-08-13
Payer: MEDICAID

## 2024-08-13 ENCOUNTER — HOSPITAL ENCOUNTER (EMERGENCY)
Facility: HOSPITAL | Age: 34
Discharge: HOME | End: 2024-08-13
Attending: EMERGENCY MEDICINE
Payer: MEDICAID

## 2024-08-13 VITALS
TEMPERATURE: 99.2 F | HEIGHT: 63 IN | HEART RATE: 62 BPM | WEIGHT: 149 LBS | OXYGEN SATURATION: 100 % | DIASTOLIC BLOOD PRESSURE: 63 MMHG | RESPIRATION RATE: 18 BRPM | BODY MASS INDEX: 26.4 KG/M2 | SYSTOLIC BLOOD PRESSURE: 102 MMHG

## 2024-08-13 DIAGNOSIS — J20.8 ACUTE BRONCHITIS DUE TO COVID-19 VIRUS: Primary | ICD-10-CM

## 2024-08-13 DIAGNOSIS — U07.1 ACUTE BRONCHITIS DUE TO COVID-19 VIRUS: Primary | ICD-10-CM

## 2024-08-13 LAB
ANION GAP SERPL CALC-SCNC: 7 MMOL/L (ref 5–15)
BUN SERPL-MCNC: 10 MG/DL (ref 6–20)
BUN/CREAT SERPL: 14 (ref 12–20)
CA-I BLD-MCNC: 9 MG/DL (ref 8.5–10.1)
CHLORIDE SERPL-SCNC: 105 MMOL/L (ref 97–108)
CO2 SERPL-SCNC: 28 MMOL/L (ref 21–32)
CREAT SERPL-MCNC: 0.69 MG/DL (ref 0.55–1.02)
D DIMER PPP FEU-MCNC: 0.58 MG/L FEU (ref 0.19–0.5)
ERYTHROCYTE [DISTWIDTH] IN BLOOD BY AUTOMATED COUNT: 13 % (ref 11.5–14.5)
GLUCOSE SERPL-MCNC: 84 MG/DL (ref 65–100)
HCG UR QL: NEGATIVE
HCT VFR BLD AUTO: 35.8 % (ref 35–47)
HGB BLD-MCNC: 12 G/DL (ref 11.5–16)
MCH RBC QN AUTO: 31 PG (ref 26–34)
MCHC RBC AUTO-ENTMCNC: 33.5 G/DL (ref 30–36.5)
MCV RBC AUTO: 92.5 FL (ref 80–99)
PLATELET # BLD AUTO: 277 K/UL (ref 150–400)
PMV BLD AUTO: 9.7 FL (ref 8.9–12.9)
POTASSIUM SERPL-SCNC: 4.2 MMOL/L (ref 3.5–5.1)
RBC # BLD AUTO: 3.87 M/UL (ref 3.8–5.2)
SODIUM SERPL-SCNC: 140 MMOL/L (ref 136–145)
T4 FREE SERPL-MCNC: 0.8 NG/DL (ref 0.8–1.5)
TROPONIN I SERPL HS-MCNC: 4 NG/L (ref 0–51)
TSH SERPL DL<=0.05 MIU/L-ACNC: 3.47 UIU/ML (ref 0.36–3.74)
WBC # BLD AUTO: 4.4 K/UL (ref 3.6–11)

## 2024-08-13 PROCEDURE — 71045 X-RAY EXAM CHEST 1 VIEW: CPT

## 2024-08-13 PROCEDURE — 36415 COLL VENOUS BLD VENIPUNCTURE: CPT

## 2024-08-13 PROCEDURE — 85379 FIBRIN DEGRADATION QUANT: CPT

## 2024-08-13 PROCEDURE — 93005 ELECTROCARDIOGRAM TRACING: CPT | Performed by: EMERGENCY MEDICINE

## 2024-08-13 PROCEDURE — 85027 COMPLETE CBC AUTOMATED: CPT

## 2024-08-13 PROCEDURE — 84443 ASSAY THYROID STIM HORMONE: CPT

## 2024-08-13 PROCEDURE — 84439 ASSAY OF FREE THYROXINE: CPT

## 2024-08-13 PROCEDURE — 84484 ASSAY OF TROPONIN QUANT: CPT

## 2024-08-13 PROCEDURE — 80048 BASIC METABOLIC PNL TOTAL CA: CPT

## 2024-08-13 PROCEDURE — 99284 EMERGENCY DEPT VISIT MOD MDM: CPT

## 2024-08-13 PROCEDURE — 81025 URINE PREGNANCY TEST: CPT

## 2024-08-13 RX ORDER — GUAIFENESIN/DEXTROMETHORPHAN 100-10MG/5
5 SYRUP ORAL 3 TIMES DAILY PRN
Qty: 120 ML | Refills: 0 | Status: SHIPPED | OUTPATIENT
Start: 2024-08-13 | End: 2024-08-23

## 2024-08-13 RX ORDER — PREDNISONE 50 MG/1
50 TABLET ORAL DAILY
Qty: 5 TABLET | Refills: 0 | Status: SHIPPED | OUTPATIENT
Start: 2024-08-13 | End: 2024-08-18

## 2024-08-13 ASSESSMENT — LIFESTYLE VARIABLES
HOW OFTEN DO YOU HAVE A DRINK CONTAINING ALCOHOL: 2-4 TIMES A MONTH
HOW MANY STANDARD DRINKS CONTAINING ALCOHOL DO YOU HAVE ON A TYPICAL DAY: 1 OR 2

## 2024-08-13 ASSESSMENT — PAIN - FUNCTIONAL ASSESSMENT
PAIN_FUNCTIONAL_ASSESSMENT: 0-10
PAIN_FUNCTIONAL_ASSESSMENT: 0-10

## 2024-08-13 NOTE — ED PROVIDER NOTES
[facility] EMERGENCY DEPT  EMERGENCY DEPARTMENT HISTORY AND PHYSICAL EXAM      Date: 2024  Patient Name: Landy Kim  MRN: [id number]  Birthdate 1990  Date of evaluation: 2024  Provider: Tommie Alcantara MD   Note Started: 3:03 PM EDT 24    HISTORY OF PRESENT ILLNESS     Chief Complaint   Patient presents with    Shortness of Breath       History Provided By: Patient    HPI: Landy Kim is a 34 y.o. female with a history of Hashimoto thyroiditis, KEYUR, chronically elevated D-dimer, presenting for shortness of breath and Covid.  Patient states that since she was diagnosed with COVID a couple days ago has been having shortness of breath as well as cough that has been persistent.  Feels that she cannot get a deep breath in.  Also having some left upper quadrant abdominal pain has been going on for 3 weeks.  Has been taking NSAIDs and has a history of GERD.  Patient states that when she gets COVID and has had COVID before her thyroid function tests were abnormal and her D-dimer elevated.    PAST MEDICAL HISTORY   Past Medical History:  Past Medical History:   Diagnosis Date    Arnold-Chiari malformation (HCC)     Endometriosis     GERD (gastroesophageal reflux disease)     Hashimoto's disease     KEYUR (obstructive sleep apnea)     Scoliosis     Seasonal allergic rhinitis        Past Surgical History:  Past Surgical History:   Procedure Laterality Date    APPENDECTOMY       SECTION      TONSILLECTOMY         Family History:  Family History   Problem Relation Age of Onset    Lupus Mother     Cancer Other         father's uncles - stomach cancer       Social History:  Social History     Tobacco Use    Smoking status: Former     Current packs/day: 0.25     Types: Cigarettes    Smokeless tobacco: Never   Vaping Use    Vaping status: Never Used   Substance Use Topics    Alcohol use: Not Currently    Drug use: Never       Allergies:  Allergies   Allergen Reactions    Barley Grass Other (See

## 2024-08-13 NOTE — DISCHARGE INSTRUCTIONS
Thank you for choosing our Emergency Department for your care.  It is our privilege to care for you in your time of need.  In the next several days, you may receive a survey via email or mailed to your home about your experience with our team.  We would greatly appreciate you taking a few minutes to complete the survey, as we use this information to learn what we have done well and what we could be doing better. Thank you for trusting us with your care!    Below you will find a list of your tests from today's visit.   Labs  Recent Results (from the past 12 hour(s))   POC Pregnancy Urine Qual    Collection Time: 08/13/24  2:55 PM   Result Value Ref Range    Preg Test, Ur Negative Negative     CBC    Collection Time: 08/13/24  3:13 PM   Result Value Ref Range    WBC 4.4 3.6 - 11.0 K/uL    RBC 3.87 3.80 - 5.20 M/uL    Hemoglobin 12.0 11.5 - 16.0 g/dL    Hematocrit 35.8 35.0 - 47.0 %    MCV 92.5 80.0 - 99.0 FL    MCH 31.0 26.0 - 34.0 PG    MCHC 33.5 30.0 - 36.5 g/dL    RDW 13.0 11.5 - 14.5 %    Platelets 277 150 - 400 K/uL    MPV 9.7 8.9 - 12.9 FL   Basic Metabolic Panel    Collection Time: 08/13/24  3:13 PM   Result Value Ref Range    Sodium 140 136 - 145 mmol/L    Potassium 4.2 3.5 - 5.1 mmol/L    Chloride 105 97 - 108 mmol/L    CO2 28 21 - 32 mmol/L    Anion Gap 7 5 - 15 mmol/L    Glucose 84 65 - 100 mg/dL    BUN 10 6 - 20 mg/dL    Creatinine 0.69 0.55 - 1.02 mg/dL    BUN/Creatinine Ratio 14 12 - 20      Est, Glom Filt Rate >90 >60 ml/min/1.73m2    Calcium 9.0 8.5 - 10.1 mg/dL   Troponin    Collection Time: 08/13/24  3:13 PM   Result Value Ref Range    Troponin, High Sensitivity 4 0 - 51 ng/L       Radiologic Studies  XR CHEST PORTABLE   Final Result   1. No acute process. No pneumonia.      Electronically signed by HARMAN POLK        ------------------------------------------------------------------------------------------------------------  The evaluation and treatment you received in the Emergency

## 2024-08-13 NOTE — ED TRIAGE NOTES
Patient reporting testing positive for Covid last Thursday and is now having shortness of breath and chest pain that radiates to back.     Patient also reporting abdominal pain x 3 weeks and states could be pregnant.

## 2024-08-14 LAB
EKG ATRIAL RATE: 72 BPM
EKG DIAGNOSIS: NORMAL
EKG P AXIS: 59 DEGREES
EKG P-R INTERVAL: 180 MS
EKG Q-T INTERVAL: 392 MS
EKG QRS DURATION: 74 MS
EKG QTC CALCULATION (BAZETT): 429 MS
EKG R AXIS: 70 DEGREES
EKG T AXIS: 40 DEGREES
EKG VENTRICULAR RATE: 72 BPM

## 2024-08-15 ENCOUNTER — APPOINTMENT (OUTPATIENT)
Facility: HOSPITAL | Age: 34
End: 2024-08-15
Payer: MEDICAID

## 2024-08-15 ENCOUNTER — HOSPITAL ENCOUNTER (EMERGENCY)
Facility: HOSPITAL | Age: 34
Discharge: HOME | End: 2024-08-15
Attending: STUDENT IN AN ORGANIZED HEALTH CARE EDUCATION/TRAINING PROGRAM
Payer: MEDICAID

## 2024-08-15 VITALS
DIASTOLIC BLOOD PRESSURE: 52 MMHG | BODY MASS INDEX: 26.4 KG/M2 | WEIGHT: 149 LBS | TEMPERATURE: 99 F | HEIGHT: 63 IN | OXYGEN SATURATION: 98 % | HEART RATE: 76 BPM | RESPIRATION RATE: 16 BRPM | SYSTOLIC BLOOD PRESSURE: 93 MMHG

## 2024-08-15 DIAGNOSIS — N83.202 CYST OF LEFT OVARY: Primary | ICD-10-CM

## 2024-08-15 LAB
ALBUMIN SERPL-MCNC: 3.9 G/DL (ref 3.5–5)
ALBUMIN/GLOB SERPL: 1.3 (ref 1.1–2.2)
ALP SERPL-CCNC: 50 U/L (ref 45–117)
ALT SERPL-CCNC: 25 U/L (ref 12–78)
ANION GAP SERPL CALC-SCNC: 4 MMOL/L (ref 5–15)
APPEARANCE UR: ABNORMAL
AST SERPL W P-5'-P-CCNC: 12 U/L (ref 15–37)
BACTERIA URNS QL MICRO: NEGATIVE /HPF
BASOPHILS # BLD: 0.1 K/UL (ref 0–0.1)
BASOPHILS NFR BLD: 2 % (ref 0–1)
BILIRUB SERPL-MCNC: 0.6 MG/DL (ref 0.2–1)
BILIRUB UR QL: NEGATIVE
BUN SERPL-MCNC: 7 MG/DL (ref 6–20)
BUN/CREAT SERPL: 10 (ref 12–20)
CA-I BLD-MCNC: 9.6 MG/DL (ref 8.5–10.1)
CHLORIDE SERPL-SCNC: 107 MMOL/L (ref 97–108)
CO2 SERPL-SCNC: 26 MMOL/L (ref 21–32)
COLOR UR: ABNORMAL
CREAT SERPL-MCNC: 0.68 MG/DL (ref 0.55–1.02)
DIFFERENTIAL METHOD BLD: ABNORMAL
EOSINOPHIL # BLD: 0.2 K/UL (ref 0–0.4)
EOSINOPHIL NFR BLD: 4 % (ref 0–7)
EPITH CASTS URNS QL MICRO: ABNORMAL /LPF
ERYTHROCYTE [DISTWIDTH] IN BLOOD BY AUTOMATED COUNT: 13.3 % (ref 11.5–14.5)
GLOBULIN SER CALC-MCNC: 3 G/DL (ref 2–4)
GLUCOSE SERPL-MCNC: 91 MG/DL (ref 65–100)
GLUCOSE UR STRIP.AUTO-MCNC: NEGATIVE MG/DL
HCT VFR BLD AUTO: 35.8 % (ref 35–47)
HGB BLD-MCNC: 12 G/DL (ref 11.5–16)
HGB UR QL STRIP: NEGATIVE
IMM GRANULOCYTES # BLD AUTO: 0 K/UL (ref 0–0.04)
IMM GRANULOCYTES NFR BLD AUTO: 0 % (ref 0–0.5)
KETONES UR QL STRIP.AUTO: NEGATIVE MG/DL
LEUKOCYTE ESTERASE UR QL STRIP.AUTO: ABNORMAL
LIPASE SERPL-CCNC: 29 U/L (ref 13–75)
LYMPHOCYTES # BLD: 2.3 K/UL (ref 0.8–3.5)
LYMPHOCYTES NFR BLD: 53 % (ref 12–49)
MCH RBC QN AUTO: 30.5 PG (ref 26–34)
MCHC RBC AUTO-ENTMCNC: 33.5 G/DL (ref 30–36.5)
MCV RBC AUTO: 90.9 FL (ref 80–99)
MONOCYTES # BLD: 0.2 K/UL (ref 0–1)
MONOCYTES NFR BLD: 6 % (ref 5–13)
MUCOUS THREADS URNS QL MICRO: ABNORMAL /LPF
NEUTS SEG # BLD: 1.5 K/UL (ref 1.8–8)
NEUTS SEG NFR BLD: 35 % (ref 32–75)
NITRITE UR QL STRIP.AUTO: NEGATIVE
NRBC # BLD: 0 K/UL (ref 0–0.01)
NRBC BLD-RTO: 0 PER 100 WBC
OTHER: ABNORMAL
PH UR STRIP: 6 (ref 5–8)
PLATELET # BLD AUTO: 269 K/UL (ref 150–400)
PMV BLD AUTO: 9.9 FL (ref 8.9–12.9)
POTASSIUM SERPL-SCNC: 4.6 MMOL/L (ref 3.5–5.1)
PROT SERPL-MCNC: 6.9 G/DL (ref 6.4–8.2)
PROT UR STRIP-MCNC: NEGATIVE MG/DL
RBC # BLD AUTO: 3.94 M/UL (ref 3.8–5.2)
RBC #/AREA URNS HPF: ABNORMAL /HPF (ref 0–5)
SODIUM SERPL-SCNC: 137 MMOL/L (ref 136–145)
SP GR UR REFRACTOMETRY: 1.01 (ref 1–1.03)
URINE CULTURE IF INDICATED: ABNORMAL
UROBILINOGEN UR QL STRIP.AUTO: 0.1 EU/DL (ref 0.1–1)
WBC # BLD AUTO: 4.2 K/UL (ref 3.6–11)
WBC URNS QL MICRO: ABNORMAL /HPF (ref 0–4)

## 2024-08-15 PROCEDURE — 83690 ASSAY OF LIPASE: CPT

## 2024-08-15 PROCEDURE — 81001 URINALYSIS AUTO W/SCOPE: CPT

## 2024-08-15 PROCEDURE — 85025 COMPLETE CBC W/AUTO DIFF WBC: CPT

## 2024-08-15 PROCEDURE — 6360000002 HC RX W HCPCS: Performed by: STUDENT IN AN ORGANIZED HEALTH CARE EDUCATION/TRAINING PROGRAM

## 2024-08-15 PROCEDURE — 80053 COMPREHEN METABOLIC PANEL: CPT

## 2024-08-15 PROCEDURE — 36415 COLL VENOUS BLD VENIPUNCTURE: CPT

## 2024-08-15 PROCEDURE — 2580000003 HC RX 258: Performed by: STUDENT IN AN ORGANIZED HEALTH CARE EDUCATION/TRAINING PROGRAM

## 2024-08-15 PROCEDURE — 96361 HYDRATE IV INFUSION ADD-ON: CPT

## 2024-08-15 PROCEDURE — 76830 TRANSVAGINAL US NON-OB: CPT

## 2024-08-15 PROCEDURE — 96375 TX/PRO/DX INJ NEW DRUG ADDON: CPT

## 2024-08-15 PROCEDURE — 99284 EMERGENCY DEPT VISIT MOD MDM: CPT

## 2024-08-15 PROCEDURE — 96374 THER/PROPH/DIAG INJ IV PUSH: CPT

## 2024-08-15 PROCEDURE — 94761 N-INVAS EAR/PLS OXIMETRY MLT: CPT

## 2024-08-15 RX ORDER — 0.9 % SODIUM CHLORIDE 0.9 %
1000 INTRAVENOUS SOLUTION INTRAVENOUS ONCE
Status: COMPLETED | OUTPATIENT
Start: 2024-08-15 | End: 2024-08-15

## 2024-08-15 RX ORDER — MORPHINE SULFATE 4 MG/ML
6 INJECTION, SOLUTION INTRAMUSCULAR; INTRAVENOUS
Status: COMPLETED | OUTPATIENT
Start: 2024-08-15 | End: 2024-08-15

## 2024-08-15 RX ORDER — ONDANSETRON 4 MG/1
4 TABLET, ORALLY DISINTEGRATING ORAL 3 TIMES DAILY PRN
Qty: 21 TABLET | Refills: 0 | Status: SHIPPED | OUTPATIENT
Start: 2024-08-15

## 2024-08-15 RX ORDER — ONDANSETRON 2 MG/ML
4 INJECTION INTRAMUSCULAR; INTRAVENOUS ONCE
Status: COMPLETED | OUTPATIENT
Start: 2024-08-15 | End: 2024-08-15

## 2024-08-15 RX ORDER — OXYCODONE HYDROCHLORIDE AND ACETAMINOPHEN 5; 325 MG/1; MG/1
1 TABLET ORAL EVERY 6 HOURS PRN
Qty: 12 TABLET | Refills: 0 | Status: SHIPPED | OUTPATIENT
Start: 2024-08-15 | End: 2024-08-18

## 2024-08-15 RX ADMIN — SODIUM CHLORIDE 1000 ML: 9 INJECTION, SOLUTION INTRAVENOUS at 13:05

## 2024-08-15 RX ADMIN — ONDANSETRON 4 MG: 2 INJECTION INTRAMUSCULAR; INTRAVENOUS at 13:05

## 2024-08-15 RX ADMIN — MORPHINE SULFATE 6 MG: 4 INJECTION, SOLUTION INTRAMUSCULAR; INTRAVENOUS at 13:04

## 2024-08-15 ASSESSMENT — PAIN SCALES - GENERAL
PAINLEVEL_OUTOF10: 10
PAINLEVEL_OUTOF10: 7
PAINLEVEL_OUTOF10: 8

## 2024-08-15 ASSESSMENT — PAIN DESCRIPTION - LOCATION
LOCATION: ABDOMEN
LOCATION: ABDOMEN;PELVIS

## 2024-08-15 ASSESSMENT — PAIN - FUNCTIONAL ASSESSMENT
PAIN_FUNCTIONAL_ASSESSMENT: 0-10
PAIN_FUNCTIONAL_ASSESSMENT: ACTIVITIES ARE NOT PREVENTED

## 2024-08-15 ASSESSMENT — PAIN DESCRIPTION - DESCRIPTORS
DESCRIPTORS: DISCOMFORT
DESCRIPTORS: SHARP;STABBING

## 2024-08-15 NOTE — ED PROVIDER NOTES
tablet  Commonly known as: TYLENOL     FLUoxetine 10 MG capsule  Commonly known as: PROZAC     guaiFENesin-dextromethorphan 100-10 MG/5ML syrup  Commonly known as: ROBITUSSIN DM  Take 5 mLs by mouth 3 times daily as needed for Cough     ibuprofen 600 MG tablet  Commonly known as: ADVIL;MOTRIN     levothyroxine 25 MCG tablet  Commonly known as: SYNTHROID     montelukast 10 MG tablet  Commonly known as: SINGULAIR     omeprazole 20 MG delayed release capsule  Commonly known as: PRILOSEC     ondansetron 4 MG disintegrating tablet  Commonly known as: ZOFRAN-ODT     pantoprazole 40 MG tablet  Commonly known as: PROTONIX     predniSONE 50 MG tablet  Commonly known as: DELTASONE  Take 1 tablet by mouth daily for 5 days     * SUMAtriptan 100 MG tablet  Commonly known as: IMITREX     * SUMAtriptan Succinate Refill 6 MG/0.5ML Soct     ZyrTEC Allergy 10 MG Caps  Generic drug: Cetirizine HCl           * This list has 2 medication(s) that are the same as other medications prescribed for you. Read the directions carefully, and ask your doctor or other care provider to review them with you.                    DISCONTINUED MEDICATIONS:  Current Discharge Medication List          I am the Primary Clinician of Record. Bernard Whipple MD (electronically signed)    (Please note that parts of this dictation were completed with voice recognition software. Quite often unanticipated grammatical, syntax, homophones, and other interpretive errors are inadvertently transcribed by the computer software. Please disregards these errors. Please excuse any errors that have escaped final proofreading.)

## 2024-08-15 NOTE — ED TRIAGE NOTES
C/o abdominal pain that began left side, across abdomen to right side, and is now radiating to pelvic area.   Recently diagnosed with a 4cm cyst on left ovary

## 2024-08-15 NOTE — DISCHARGE INSTRUCTIONS
changes in your illness and treatment, and (3) for ongoing care. Please take your discharge instructions with you when you go to your follow-up appointment.     If you have any problem arranging a follow-up appointment, contact us!  If your symptoms become worse or you do not improve as expected, please return to us. We are available 24 hours a day.     If a prescription has been provided, please fill it as soon as possible to prevent a delay in treatment. If you have any questions or reservations about taking the medication due to side effects or interactions with other medications, please call your primary care provider or contact us directly.  Again, THANK YOU for choosing us to care for YOU!

## 2024-11-06 ENCOUNTER — APPOINTMENT (OUTPATIENT)
Facility: HOSPITAL | Age: 34
End: 2024-11-06
Payer: MEDICAID

## 2024-11-06 ENCOUNTER — HOSPITAL ENCOUNTER (EMERGENCY)
Facility: HOSPITAL | Age: 34
Discharge: HOME | End: 2024-11-06
Attending: STUDENT IN AN ORGANIZED HEALTH CARE EDUCATION/TRAINING PROGRAM
Payer: MEDICAID

## 2024-11-06 VITALS
RESPIRATION RATE: 19 BRPM | TEMPERATURE: 98.8 F | OXYGEN SATURATION: 98 % | DIASTOLIC BLOOD PRESSURE: 67 MMHG | SYSTOLIC BLOOD PRESSURE: 124 MMHG | HEART RATE: 76 BPM

## 2024-11-06 DIAGNOSIS — N94.9 ADNEXAL CYST: ICD-10-CM

## 2024-11-06 DIAGNOSIS — R10.9 FLANK PAIN: Primary | ICD-10-CM

## 2024-11-06 LAB
ALBUMIN SERPL-MCNC: 3.5 G/DL (ref 3.5–5)
ALBUMIN/GLOB SERPL: 1.1 (ref 1.1–2.2)
ALP SERPL-CCNC: 56 U/L (ref 45–117)
ALT SERPL-CCNC: 30 U/L (ref 12–78)
ANION GAP SERPL CALC-SCNC: 9 MMOL/L (ref 2–12)
APPEARANCE UR: CLEAR
AST SERPL W P-5'-P-CCNC: 15 U/L (ref 15–37)
BACTERIA URNS QL MICRO: NEGATIVE /HPF
BASOPHILS # BLD: 0 K/UL (ref 0–0.1)
BASOPHILS NFR BLD: 1 % (ref 0–1)
BILIRUB SERPL-MCNC: 0.4 MG/DL (ref 0.2–1)
BILIRUB UR QL: NEGATIVE
BUN SERPL-MCNC: 9 MG/DL (ref 6–20)
BUN/CREAT SERPL: 15 (ref 12–20)
CA-I BLD-MCNC: 9 MG/DL (ref 8.5–10.1)
CHLORIDE SERPL-SCNC: 100 MMOL/L (ref 97–108)
CO2 SERPL-SCNC: 26 MMOL/L (ref 21–32)
COLOR UR: YELLOW
CREAT SERPL-MCNC: 0.61 MG/DL (ref 0.55–1.02)
DIFFERENTIAL METHOD BLD: ABNORMAL
EOSINOPHIL # BLD: 0.2 K/UL (ref 0–0.4)
EOSINOPHIL NFR BLD: 3 % (ref 0–7)
EPITH CASTS URNS QL MICRO: ABNORMAL /LPF
ERYTHROCYTE [DISTWIDTH] IN BLOOD BY AUTOMATED COUNT: 12.8 % (ref 11.5–14.5)
GLOBULIN SER CALC-MCNC: 3.3 G/DL (ref 2–4)
GLUCOSE SERPL-MCNC: 89 MG/DL (ref 65–100)
GLUCOSE UR STRIP.AUTO-MCNC: NEGATIVE MG/DL
HCG, URINE, POC: NEGATIVE
HCT VFR BLD AUTO: 34.1 % (ref 35–47)
HGB BLD-MCNC: 11.3 G/DL (ref 11.5–16)
HGB UR QL STRIP: ABNORMAL
IMM GRANULOCYTES # BLD AUTO: 0 K/UL (ref 0–0.04)
IMM GRANULOCYTES NFR BLD AUTO: 0 % (ref 0–0.5)
KETONES UR QL STRIP.AUTO: NEGATIVE MG/DL
LEUKOCYTE ESTERASE UR QL STRIP.AUTO: NEGATIVE
LYMPHOCYTES # BLD: 2.7 K/UL (ref 0.8–3.5)
LYMPHOCYTES NFR BLD: 51 % (ref 12–49)
Lab: NORMAL
MCH RBC QN AUTO: 30.2 PG (ref 26–34)
MCHC RBC AUTO-ENTMCNC: 33.1 G/DL (ref 30–36.5)
MCV RBC AUTO: 91.2 FL (ref 80–99)
MONOCYTES # BLD: 0.4 K/UL (ref 0–1)
MONOCYTES NFR BLD: 7 % (ref 5–13)
NEGATIVE QC PASS/FAIL: NORMAL
NEUTS SEG # BLD: 2 K/UL (ref 1.8–8)
NEUTS SEG NFR BLD: 38 % (ref 32–75)
NITRITE UR QL STRIP.AUTO: NEGATIVE
PH UR STRIP: 7 (ref 5–8)
PLATELET # BLD AUTO: 261 K/UL (ref 150–400)
PMV BLD AUTO: 9.9 FL (ref 8.9–12.9)
POSITIVE QC PASS/FAIL: NORMAL
POTASSIUM SERPL-SCNC: 3.8 MMOL/L (ref 3.5–5.1)
PROT SERPL-MCNC: 6.8 G/DL (ref 6.4–8.2)
PROT UR STRIP-MCNC: NEGATIVE MG/DL
RBC # BLD AUTO: 3.74 M/UL (ref 3.8–5.2)
RBC #/AREA URNS HPF: ABNORMAL /HPF (ref 0–5)
SODIUM SERPL-SCNC: 135 MMOL/L (ref 136–145)
SP GR UR REFRACTOMETRY: 1.01 (ref 1–1.03)
URINE CULTURE IF INDICATED: ABNORMAL
UROBILINOGEN UR QL STRIP.AUTO: 0.1 EU/DL (ref 0.2–1)
WBC # BLD AUTO: 5.2 K/UL (ref 3.6–11)
WBC URNS QL MICRO: ABNORMAL /HPF (ref 0–4)

## 2024-11-06 PROCEDURE — 74176 CT ABD & PELVIS W/O CONTRAST: CPT

## 2024-11-06 PROCEDURE — 96375 TX/PRO/DX INJ NEW DRUG ADDON: CPT

## 2024-11-06 PROCEDURE — 96374 THER/PROPH/DIAG INJ IV PUSH: CPT

## 2024-11-06 PROCEDURE — 76830 TRANSVAGINAL US NON-OB: CPT

## 2024-11-06 PROCEDURE — 81001 URINALYSIS AUTO W/SCOPE: CPT

## 2024-11-06 PROCEDURE — 80053 COMPREHEN METABOLIC PANEL: CPT

## 2024-11-06 PROCEDURE — 87086 URINE CULTURE/COLONY COUNT: CPT

## 2024-11-06 PROCEDURE — 99284 EMERGENCY DEPT VISIT MOD MDM: CPT

## 2024-11-06 PROCEDURE — 85025 COMPLETE CBC W/AUTO DIFF WBC: CPT

## 2024-11-06 PROCEDURE — 6360000002 HC RX W HCPCS: Performed by: STUDENT IN AN ORGANIZED HEALTH CARE EDUCATION/TRAINING PROGRAM

## 2024-11-06 RX ORDER — MORPHINE SULFATE 4 MG/ML
4 INJECTION, SOLUTION INTRAMUSCULAR; INTRAVENOUS
Status: COMPLETED | OUTPATIENT
Start: 2024-11-06 | End: 2024-11-06

## 2024-11-06 RX ORDER — IBUPROFEN 800 MG/1
800 TABLET, FILM COATED ORAL EVERY 8 HOURS PRN
Qty: 12 TABLET | Refills: 0 | Status: SHIPPED | OUTPATIENT
Start: 2024-11-06 | End: 2024-11-11

## 2024-11-06 RX ORDER — ONDANSETRON 2 MG/ML
4 INJECTION INTRAMUSCULAR; INTRAVENOUS ONCE
Status: COMPLETED | OUTPATIENT
Start: 2024-11-06 | End: 2024-11-06

## 2024-11-06 RX ADMIN — ONDANSETRON 4 MG: 2 INJECTION INTRAMUSCULAR; INTRAVENOUS at 17:33

## 2024-11-06 RX ADMIN — MORPHINE SULFATE 4 MG: 4 INJECTION, SOLUTION INTRAMUSCULAR; INTRAVENOUS at 17:33

## 2024-11-06 ASSESSMENT — PAIN DESCRIPTION - LOCATION: LOCATION: FLANK

## 2024-11-06 ASSESSMENT — PAIN SCALES - GENERAL
PAINLEVEL_OUTOF10: 10
PAINLEVEL_OUTOF10: 10

## 2024-11-06 ASSESSMENT — PAIN - FUNCTIONAL ASSESSMENT: PAIN_FUNCTIONAL_ASSESSMENT: 0-10

## 2024-11-06 NOTE — ED TRIAGE NOTES
Pt reports flank pain/back pain sudden onset. Pt reports URI symptoms x 2 days with sick children at home. Pt reports slight relief with urination priro to arrival

## 2024-11-06 NOTE — ED PROVIDER NOTES
[facility] EMERGENCY DEPT  EMERGENCY DEPARTMENT HISTORY AND PHYSICAL EXAM      Date: 2024  Patient Name: Landy Kim  MRN: [id number]  Birthdate 1990  Date of evaluation: 2024  Provider: Naida Petersen MD   Note Started: 5:07 PM EST 24    HISTORY OF PRESENT ILLNESS     Chief Complaint   Patient presents with    Flank Pain       History Provided By: Patient    HPI: Landy Kim is a 34 y.o. female with PMH of GERD and scoliosis comes to the ED with acute onset of right flank pain radiating to the right groin area that has improved after passing urine.  She did not have any recent illnesses.  No recent dysuria hematuria fosamil urine.  No prior study of kidney stones.  Has not had anything similar in the past.  Patient denies any history of chronic back pain.    PAST MEDICAL HISTORY   Past Medical History:  Past Medical History:   Diagnosis Date    Arnold-Chiari malformation (HCC)     Endometriosis     GERD (gastroesophageal reflux disease)     Hashimoto's disease     KEYUR (obstructive sleep apnea)     Scoliosis     Seasonal allergic rhinitis        Past Surgical History:  Past Surgical History:   Procedure Laterality Date    APPENDECTOMY       SECTION      TONSILLECTOMY         Family History:  Family History   Problem Relation Age of Onset    Lupus Mother     Cancer Other         father's uncles - stomach cancer       Social History:  Social History     Tobacco Use    Smoking status: Former     Current packs/day: 0.25     Types: Cigarettes    Smokeless tobacco: Never   Vaping Use    Vaping status: Never Used   Substance Use Topics    Alcohol use: Not Currently    Drug use: Never       Allergies:  Allergies   Allergen Reactions    Barley Grass Other (See Comments)     Swelling     Shellfish Allergy      Other reaction(s): Unknown (comments)    Soy Other (See Comments)     Swelling    Wheat Other (See Comments)     Swelling       PCP: Travis Brandt MD    Current Meds:   No current

## 2024-11-07 LAB
BACTERIA SPEC CULT: NORMAL
Lab: NORMAL

## 2024-11-07 NOTE — ED PROVIDER NOTES
Care transitioned to me at shift change to follow-up on ultrasound.  Ultrasound demonstrates adnexa hemorrhagic cyst corresponding to the lesion seen on  CT. No pelvic free fluid.  Suspect this is more likely incidental as primary concern is flank pain.  Pregnancy test negative.  Feeling much better on reassessment.  Would like to be discharged home.  Recommend she follow-up with OB/GYN for further evaluation of cyst.  Feel safe for discharge home     Alfonso James, DO  11/06/24 6992

## 2024-11-07 NOTE — ED NOTES
Pt discharged with instruction. Pt verbalized understanding. Pt stable for discharge. Pt ambulated from ed with steady gait. NAD noted at the time of discharge.

## 2024-11-07 NOTE — DISCHARGE INSTRUCTIONS
Thank you for choosing our Emergency Department for your care.  It is our privilege to care for you in your time of need.  In the next several days, you may receive a survey via email or mailed to your home about your experience with our team.  We would greatly appreciate you taking a few minutes to complete the survey, as we use this information to learn what we have done well and what we could be doing better. Thank you for trusting us with your care!    Below you will find a list of your tests from today's visit.   Labs  Recent Results (from the past 12 hour(s))   Urinalysis with Reflex to Culture    Collection Time: 11/06/24  5:15 PM    Specimen: Urine   Result Value Ref Range    Color, UA Yellow      Appearance Clear Clear      Specific Gravity, UA 1.015 1.003 - 1.030      pH, Urine 7.0 5.0 - 8.0      Protein, UA Negative Negative mg/dL    Glucose, Ur Negative Negative mg/dL    Ketones, Urine Negative Negative mg/dL    Bilirubin, Urine Negative Negative      Blood, Urine Small (A) Negative      Urobilinogen, Urine 0.1 (L) 0.2 - 1.0 EU/dL    Nitrite, Urine Negative Negative      Leukocyte Esterase, Urine Negative Negative      WBC, UA 0-4 0 - 4 /hpf    RBC, UA 5-10 0 - 5 /hpf    Epithelial Cells, UA Few Few /lpf    BACTERIA, URINE Negative Negative /hpf    Urine Culture if Indicated Culture not indicated by UA result Culture not indicated by UA result     POC Pregnancy Urine Qual    Collection Time: 11/06/24  5:17 PM   Result Value Ref Range    HCG, Urine, POC Negative Negative    Lot Number [id number]     Positive QC Pass/Fail Acceptable     Negative QC Pass/Fail Acceptable    CBC with Auto Differential    Collection Time: 11/06/24  5:30 PM   Result Value Ref Range    WBC 5.2 3.6 - 11.0 K/uL    RBC 3.74 (L) 3.80 - 5.20 M/uL    Hemoglobin 11.3 (L) 11.5 - 16.0 g/dL    Hematocrit 34.1 (L) 35.0 - 47.0 %    MCV 91.2 80.0 - 99.0 FL    MCH 30.2 26.0 - 34.0 PG    MCHC 33.1 30.0 - 36.5 g/dL    RDW 12.8 11.5 - 14.5 %

## 2024-11-12 ENCOUNTER — TELEPHONE (OUTPATIENT)
Age: 34
End: 2024-11-12

## 2024-12-01 ENCOUNTER — APPOINTMENT (OUTPATIENT)
Facility: HOSPITAL | Age: 34
End: 2024-12-01
Payer: MEDICAID

## 2024-12-01 ENCOUNTER — HOSPITAL ENCOUNTER (EMERGENCY)
Facility: HOSPITAL | Age: 34
Discharge: HOME | End: 2024-12-01
Attending: EMERGENCY MEDICINE
Payer: MEDICAID

## 2024-12-01 VITALS
HEIGHT: 63 IN | WEIGHT: 150 LBS | RESPIRATION RATE: 15 BRPM | TEMPERATURE: 98 F | SYSTOLIC BLOOD PRESSURE: 101 MMHG | OXYGEN SATURATION: 100 % | HEART RATE: 82 BPM | DIASTOLIC BLOOD PRESSURE: 72 MMHG | BODY MASS INDEX: 26.58 KG/M2

## 2024-12-01 DIAGNOSIS — N13.30 HYDRONEPHROSIS, UNSPECIFIED HYDRONEPHROSIS TYPE: ICD-10-CM

## 2024-12-01 DIAGNOSIS — N83.209 HEMORRHAGIC CYST OF OVARY: ICD-10-CM

## 2024-12-01 DIAGNOSIS — N20.0 KIDNEY STONE: Primary | ICD-10-CM

## 2024-12-01 LAB
ALBUMIN SERPL-MCNC: 3.6 G/DL (ref 3.5–5)
ALBUMIN/GLOB SERPL: 1.1 (ref 1.1–2.2)
ALP SERPL-CCNC: 52 U/L (ref 45–117)
ALT SERPL-CCNC: 25 U/L (ref 12–78)
ANION GAP SERPL CALC-SCNC: 7 MMOL/L (ref 2–12)
APPEARANCE UR: CLEAR
AST SERPL W P-5'-P-CCNC: 15 U/L (ref 15–37)
BACTERIA URNS QL MICRO: NEGATIVE /HPF
BASOPHILS # BLD: 0.1 K/UL (ref 0–0.1)
BASOPHILS NFR BLD: 1 % (ref 0–1)
BILIRUB SERPL-MCNC: 0.4 MG/DL (ref 0.2–1)
BILIRUB UR QL: NEGATIVE
BUN SERPL-MCNC: 7 MG/DL (ref 6–20)
BUN/CREAT SERPL: 7 (ref 12–20)
CA-I BLD-MCNC: 9 MG/DL (ref 8.5–10.1)
CHLORIDE SERPL-SCNC: 100 MMOL/L (ref 97–108)
CO2 SERPL-SCNC: 25 MMOL/L (ref 21–32)
COLOR UR: YELLOW
CREAT SERPL-MCNC: 0.94 MG/DL (ref 0.55–1.02)
DIFFERENTIAL METHOD BLD: NORMAL
EOSINOPHIL # BLD: 0.1 K/UL (ref 0–0.4)
EOSINOPHIL NFR BLD: 2 % (ref 0–7)
EPITH CASTS URNS QL MICRO: ABNORMAL /LPF
ERYTHROCYTE [DISTWIDTH] IN BLOOD BY AUTOMATED COUNT: 12.7 % (ref 11.5–14.5)
GLOBULIN SER CALC-MCNC: 3.4 G/DL (ref 2–4)
GLUCOSE SERPL-MCNC: 124 MG/DL (ref 65–100)
GLUCOSE UR STRIP.AUTO-MCNC: NEGATIVE MG/DL
HCG SERPL QL: NEGATIVE
HCT VFR BLD AUTO: 35.3 % (ref 35–47)
HGB BLD-MCNC: 11.7 G/DL (ref 11.5–16)
HGB UR QL STRIP: ABNORMAL
IMM GRANULOCYTES # BLD AUTO: 0 K/UL (ref 0–0.04)
IMM GRANULOCYTES NFR BLD AUTO: 0 % (ref 0–0.5)
KETONES UR QL STRIP.AUTO: NEGATIVE MG/DL
LEUKOCYTE ESTERASE UR QL STRIP.AUTO: NEGATIVE
LIPASE SERPL-CCNC: 25 U/L (ref 13–75)
LYMPHOCYTES # BLD: 1.8 K/UL (ref 0.8–3.5)
LYMPHOCYTES NFR BLD: 29 % (ref 12–49)
MCH RBC QN AUTO: 30.6 PG (ref 26–34)
MCHC RBC AUTO-ENTMCNC: 33.1 G/DL (ref 30–36.5)
MCV RBC AUTO: 92.4 FL (ref 80–99)
MONOCYTES # BLD: 0.5 K/UL (ref 0–1)
MONOCYTES NFR BLD: 8 % (ref 5–13)
NEUTS SEG # BLD: 3.7 K/UL (ref 1.8–8)
NEUTS SEG NFR BLD: 60 % (ref 32–75)
NITRITE UR QL STRIP.AUTO: NEGATIVE
PH UR STRIP: 7 (ref 5–8)
PLATELET # BLD AUTO: 275 K/UL (ref 150–400)
PMV BLD AUTO: 9 FL (ref 8.9–12.9)
POTASSIUM SERPL-SCNC: 3.6 MMOL/L (ref 3.5–5.1)
PROT SERPL-MCNC: 7 G/DL (ref 6.4–8.2)
PROT UR STRIP-MCNC: NEGATIVE MG/DL
RBC # BLD AUTO: 3.82 M/UL (ref 3.8–5.2)
RBC #/AREA URNS HPF: ABNORMAL /HPF (ref 0–5)
SODIUM SERPL-SCNC: 132 MMOL/L (ref 136–145)
SP GR UR REFRACTOMETRY: 1.01 (ref 1–1.03)
URINE CULTURE IF INDICATED: ABNORMAL
UROBILINOGEN UR QL STRIP.AUTO: 0.1 EU/DL (ref 0.2–1)
WBC # BLD AUTO: 6.3 K/UL (ref 3.6–11)
WBC URNS QL MICRO: ABNORMAL /HPF (ref 0–4)

## 2024-12-01 PROCEDURE — 6360000002 HC RX W HCPCS: Performed by: EMERGENCY MEDICINE

## 2024-12-01 PROCEDURE — 84703 CHORIONIC GONADOTROPIN ASSAY: CPT

## 2024-12-01 PROCEDURE — 85025 COMPLETE CBC W/AUTO DIFF WBC: CPT

## 2024-12-01 PROCEDURE — 81001 URINALYSIS AUTO W/SCOPE: CPT

## 2024-12-01 PROCEDURE — 99285 EMERGENCY DEPT VISIT HI MDM: CPT

## 2024-12-01 PROCEDURE — 6360000004 HC RX CONTRAST MEDICATION: Performed by: EMERGENCY MEDICINE

## 2024-12-01 PROCEDURE — 96374 THER/PROPH/DIAG INJ IV PUSH: CPT

## 2024-12-01 PROCEDURE — 6370000000 HC RX 637 (ALT 250 FOR IP): Performed by: EMERGENCY MEDICINE

## 2024-12-01 PROCEDURE — 76830 TRANSVAGINAL US NON-OB: CPT

## 2024-12-01 PROCEDURE — 74177 CT ABD & PELVIS W/CONTRAST: CPT

## 2024-12-01 PROCEDURE — 80053 COMPREHEN METABOLIC PANEL: CPT

## 2024-12-01 PROCEDURE — 96375 TX/PRO/DX INJ NEW DRUG ADDON: CPT

## 2024-12-01 PROCEDURE — 83690 ASSAY OF LIPASE: CPT

## 2024-12-01 PROCEDURE — 36415 COLL VENOUS BLD VENIPUNCTURE: CPT

## 2024-12-01 PROCEDURE — 2500000003 HC RX 250 WO HCPCS: Performed by: EMERGENCY MEDICINE

## 2024-12-01 RX ORDER — OXYCODONE AND ACETAMINOPHEN 5; 325 MG/1; MG/1
1 TABLET ORAL
Status: COMPLETED | OUTPATIENT
Start: 2024-12-01 | End: 2024-12-01

## 2024-12-01 RX ORDER — IOPAMIDOL 755 MG/ML
100 INJECTION, SOLUTION INTRAVASCULAR
Status: COMPLETED | OUTPATIENT
Start: 2024-12-01 | End: 2024-12-01

## 2024-12-01 RX ORDER — MORPHINE SULFATE 4 MG/ML
4 INJECTION, SOLUTION INTRAMUSCULAR; INTRAVENOUS
Status: COMPLETED | OUTPATIENT
Start: 2024-12-01 | End: 2024-12-01

## 2024-12-01 RX ORDER — ONDANSETRON 4 MG/1
4 TABLET, ORALLY DISINTEGRATING ORAL EVERY 8 HOURS PRN
Qty: 12 TABLET | Refills: 0 | Status: ON HOLD | OUTPATIENT
Start: 2024-12-01 | End: 2024-12-05 | Stop reason: HOSPADM

## 2024-12-01 RX ORDER — KETOROLAC TROMETHAMINE 15 MG/ML
15 INJECTION, SOLUTION INTRAMUSCULAR; INTRAVENOUS ONCE
Status: COMPLETED | OUTPATIENT
Start: 2024-12-01 | End: 2024-12-01

## 2024-12-01 RX ORDER — OXYCODONE AND ACETAMINOPHEN 10; 325 MG/1; MG/1
1 TABLET ORAL EVERY 6 HOURS PRN
Qty: 12 TABLET | Refills: 0 | Status: SHIPPED | OUTPATIENT
Start: 2024-12-01 | End: 2024-12-01

## 2024-12-01 RX ORDER — METHOCARBAMOL 750 MG/1
750 TABLET, FILM COATED ORAL EVERY 6 HOURS PRN
Qty: 20 TABLET | Refills: 0 | Status: SHIPPED | OUTPATIENT
Start: 2024-12-01 | End: 2024-12-01

## 2024-12-01 RX ORDER — ONDANSETRON 2 MG/ML
4 INJECTION INTRAMUSCULAR; INTRAVENOUS ONCE
Status: COMPLETED | OUTPATIENT
Start: 2024-12-01 | End: 2024-12-01

## 2024-12-01 RX ORDER — OXYCODONE AND ACETAMINOPHEN 10; 325 MG/1; MG/1
1 TABLET ORAL EVERY 6 HOURS PRN
Qty: 12 TABLET | Refills: 0 | Status: ON HOLD | OUTPATIENT
Start: 2024-12-01 | End: 2024-12-05 | Stop reason: HOSPADM

## 2024-12-01 RX ADMIN — IOPAMIDOL 100 ML: 755 INJECTION, SOLUTION INTRAVENOUS at 08:40

## 2024-12-01 RX ADMIN — MORPHINE SULFATE 4 MG: 4 INJECTION, SOLUTION INTRAMUSCULAR; INTRAVENOUS at 07:53

## 2024-12-01 RX ADMIN — KETOROLAC TROMETHAMINE 15 MG: 15 INJECTION, SOLUTION INTRAMUSCULAR; INTRAVENOUS at 07:53

## 2024-12-01 RX ADMIN — OXYCODONE HYDROCHLORIDE AND ACETAMINOPHEN 1 TABLET: 5; 325 TABLET ORAL at 12:33

## 2024-12-01 RX ADMIN — HYDROMORPHONE HYDROCHLORIDE 1 MG: 1 INJECTION, SOLUTION INTRAMUSCULAR; INTRAVENOUS; SUBCUTANEOUS at 08:59

## 2024-12-01 RX ADMIN — ONDANSETRON 4 MG: 2 INJECTION INTRAMUSCULAR; INTRAVENOUS at 07:53

## 2024-12-01 ASSESSMENT — PAIN - FUNCTIONAL ASSESSMENT
PAIN_FUNCTIONAL_ASSESSMENT: 0-10

## 2024-12-01 ASSESSMENT — PAIN SCALES - GENERAL
PAINLEVEL_OUTOF10: 7
PAINLEVEL_OUTOF10: 5
PAINLEVEL_OUTOF10: 3
PAINLEVEL_OUTOF10: 2
PAINLEVEL_OUTOF10: 2

## 2024-12-01 ASSESSMENT — PAIN DESCRIPTION - LOCATION
LOCATION: FLANK
LOCATION: FLANK

## 2024-12-01 ASSESSMENT — PAIN DESCRIPTION - ORIENTATION
ORIENTATION: RIGHT
ORIENTATION: RIGHT

## 2024-12-01 NOTE — DISCHARGE INSTRUCTIONS
Thank you for choosing our Emergency Department for your care.  It is our privilege to care for you in your time of need.  In the next several days, you may receive a survey via email or mailed to your home about your experience with our team.  We would greatly appreciate you taking a few minutes to complete the survey, as we use this information to learn what we have done well and what we could be doing better. Thank you for trusting us with your care!    Below you will find a list of your tests from today's visit.   Labs  Recent Results (from the past 12 hour(s))   CBC with Auto Differential    Collection Time: 12/01/24  7:50 AM   Result Value Ref Range    WBC 6.3 3.6 - 11.0 K/uL    RBC 3.82 3.80 - 5.20 M/uL    Hemoglobin 11.7 11.5 - 16.0 g/dL    Hematocrit 35.3 35.0 - 47.0 %    MCV 92.4 80.0 - 99.0 FL    MCH 30.6 26.0 - 34.0 PG    MCHC 33.1 30.0 - 36.5 g/dL    RDW 12.7 11.5 - 14.5 %    Platelets 275 150 - 400 K/uL    MPV 9.0 8.9 - 12.9 FL    Neutrophils % 60 32 - 75 %    Lymphocytes % 29 12 - 49 %    Monocytes % 8 5 - 13 %    Eosinophils % 2 0 - 7 %    Basophils % 1 0 - 1 %    Immature Granulocytes % 0 0.0 - 0.5 %    Neutrophils Absolute 3.7 1.8 - 8.0 K/UL    Lymphocytes Absolute 1.8 0.8 - 3.5 K/UL    Monocytes Absolute 0.5 0.0 - 1.0 K/UL    Eosinophils Absolute 0.1 0.0 - 0.4 K/UL    Basophils Absolute 0.1 0.0 - 0.1 K/UL    Immature Granulocytes Absolute 0.0 0.00 - 0.04 K/UL    Differential Type AUTOMATED     Comprehensive Metabolic Panel    Collection Time: 12/01/24  7:50 AM   Result Value Ref Range    Sodium 132 (L) 136 - 145 mmol/L    Potassium 3.6 3.5 - 5.1 mmol/L    Chloride 100 97 - 108 mmol/L    CO2 25 21 - 32 mmol/L    Anion Gap 7 2 - 12 mmol/L    Glucose 124 (H) 65 - 100 mg/dL    BUN 7 6 - 20 mg/dL    Creatinine 0.94 0.55 - 1.02 mg/dL    BUN/Creatinine Ratio 7 (L) 12 - 20      Est, Glom Filt Rate 82 >60 ml/min/1.73m2    Calcium 9.0 8.5 - 10.1 mg/dL    Total Bilirubin 0.4 0.2 - 1.0 mg/dL    AST 15 15

## 2024-12-01 NOTE — ED PROVIDER NOTES
known as: ZOFRAN-ODT  Take 1 tablet by mouth 3 times daily as needed for Nausea or Vomiting     pantoprazole 40 MG tablet  Commonly known as: PROTONIX     * SUMAtriptan 100 MG tablet  Commonly known as: IMITREX     * SUMAtriptan Succinate Refill 6 MG/0.5ML Soct     ZyrTEC Allergy 10 MG Caps  Generic drug: Cetirizine HCl           * This list has 2 medication(s) that are the same as other medications prescribed for you. Read the directions carefully, and ask your doctor or other care provider to review them with you.                   Where to Get Your Medications        These medications were sent to [address] [phone] - F [phone]  [address] [place] [address]      Phone: [phone]   oxyCODONE-acetaminophen  MG per tablet         DISCONTINUED MEDICATIONS:  Current Discharge Medication List          ED FINAL IMPRESSION     1. Kidney stone    2. Hemorrhagic cyst of ovary    3. Hydronephrosis, unspecified hydronephrosis type         I am the primary provider of record. David Caballero DO (electronically signed)    (Please note that parts of this dictation were completed with voice recognition software. Quite often unanticipated grammatical, syntax, homophones, and other interpretive errors are inadvertently transcribed by the computer software. Please disregards these errors. Please excuse any errors that have escaped final proofreading.)       David Caballero DO  12/01/24 0994

## 2024-12-02 ENCOUNTER — APPOINTMENT (OUTPATIENT)
Facility: HOSPITAL | Age: 34
End: 2024-12-02
Payer: MEDICAID

## 2024-12-02 ENCOUNTER — HOSPITAL ENCOUNTER (OUTPATIENT)
Facility: HOSPITAL | Age: 34
Setting detail: OBSERVATION
Discharge: HOME | End: 2024-12-05
Attending: EMERGENCY MEDICINE | Admitting: OBSTETRICS & GYNECOLOGY
Payer: MEDICAID

## 2024-12-02 DIAGNOSIS — R10.9 RIGHT FLANK PAIN: Primary | ICD-10-CM

## 2024-12-02 DIAGNOSIS — N83.201 CYST OF RIGHT OVARY: ICD-10-CM

## 2024-12-02 DIAGNOSIS — N20.0 KIDNEY STONE: ICD-10-CM

## 2024-12-02 PROBLEM — R10.31 RIGHT LOWER QUADRANT ABDOMINAL PAIN: Status: ACTIVE | Noted: 2024-12-02

## 2024-12-02 LAB
ALBUMIN SERPL-MCNC: 3 G/DL (ref 3.5–5)
ALBUMIN SERPL-MCNC: 3.6 G/DL (ref 3.5–5)
ALBUMIN/GLOB SERPL: 1 (ref 1.1–2.2)
ALBUMIN/GLOB SERPL: 1 (ref 1.1–2.2)
ALP SERPL-CCNC: 45 U/L (ref 45–117)
ALP SERPL-CCNC: 52 U/L (ref 45–117)
ALT SERPL-CCNC: 19 U/L (ref 12–78)
ALT SERPL-CCNC: 22 U/L (ref 12–78)
ANION GAP SERPL CALC-SCNC: 6 MMOL/L (ref 2–12)
ANION GAP SERPL CALC-SCNC: 7 MMOL/L (ref 2–12)
APPEARANCE UR: CLEAR
AST SERPL W P-5'-P-CCNC: 11 U/L (ref 15–37)
AST SERPL W P-5'-P-CCNC: 8 U/L (ref 15–37)
BACTERIA URNS QL MICRO: NEGATIVE /HPF
BASOPHILS # BLD: 0.1 K/UL (ref 0–0.1)
BASOPHILS NFR BLD: 1 % (ref 0–1)
BILIRUB SERPL-MCNC: 0.8 MG/DL (ref 0.2–1)
BILIRUB SERPL-MCNC: 0.9 MG/DL (ref 0.2–1)
BILIRUB UR QL: NEGATIVE
BUN SERPL-MCNC: 10 MG/DL (ref 6–20)
BUN SERPL-MCNC: 8 MG/DL (ref 6–20)
BUN/CREAT SERPL: 10 (ref 12–20)
BUN/CREAT SERPL: 9 (ref 12–20)
CA-I BLD-MCNC: 8 MG/DL (ref 8.5–10.1)
CA-I BLD-MCNC: 9.4 MG/DL (ref 8.5–10.1)
CHLORIDE SERPL-SCNC: 107 MMOL/L (ref 97–108)
CHLORIDE SERPL-SCNC: 110 MMOL/L (ref 97–108)
CO2 SERPL-SCNC: 21 MMOL/L (ref 21–32)
CO2 SERPL-SCNC: 23 MMOL/L (ref 21–32)
COLOR UR: ABNORMAL
CREAT SERPL-MCNC: 0.84 MG/DL (ref 0.55–1.02)
CREAT SERPL-MCNC: 1.08 MG/DL (ref 0.55–1.02)
DIFFERENTIAL METHOD BLD: ABNORMAL
EOSINOPHIL # BLD: 0.1 K/UL (ref 0–0.4)
EOSINOPHIL NFR BLD: 1 % (ref 0–7)
EPITH CASTS URNS QL MICRO: ABNORMAL /LPF
ERYTHROCYTE [DISTWIDTH] IN BLOOD BY AUTOMATED COUNT: 13 % (ref 11.5–14.5)
GLOBULIN SER CALC-MCNC: 3.1 G/DL (ref 2–4)
GLOBULIN SER CALC-MCNC: 3.6 G/DL (ref 2–4)
GLUCOSE SERPL-MCNC: 113 MG/DL (ref 65–100)
GLUCOSE SERPL-MCNC: 90 MG/DL (ref 65–100)
GLUCOSE UR STRIP.AUTO-MCNC: NEGATIVE MG/DL
HCG SERPL QL: NEGATIVE
HCT VFR BLD AUTO: 35 % (ref 35–47)
HGB BLD-MCNC: 11.7 G/DL (ref 11.5–16)
HGB UR QL STRIP: ABNORMAL
IMM GRANULOCYTES # BLD AUTO: 0 K/UL (ref 0–0.04)
IMM GRANULOCYTES NFR BLD AUTO: 0 % (ref 0–0.5)
KETONES UR QL STRIP.AUTO: 20 MG/DL
LEUKOCYTE ESTERASE UR QL STRIP.AUTO: NEGATIVE
LYMPHOCYTES # BLD: 1 K/UL (ref 0.8–3.5)
LYMPHOCYTES NFR BLD: 12 % (ref 12–49)
MCH RBC QN AUTO: 30.7 PG (ref 26–34)
MCHC RBC AUTO-ENTMCNC: 33.4 G/DL (ref 30–36.5)
MCV RBC AUTO: 91.9 FL (ref 80–99)
MONOCYTES # BLD: 0.5 K/UL (ref 0–1)
MONOCYTES NFR BLD: 6 % (ref 5–13)
MUCOUS THREADS URNS QL MICRO: ABNORMAL /LPF
NEUTS SEG # BLD: 6.5 K/UL (ref 1.8–8)
NEUTS SEG NFR BLD: 80 % (ref 32–75)
NITRITE UR QL STRIP.AUTO: NEGATIVE
NRBC # BLD: 0 K/UL (ref 0–0.01)
NRBC BLD-RTO: 0 PER 100 WBC
PH UR STRIP: 5 (ref 5–8)
PLATELET # BLD AUTO: 267 K/UL (ref 150–400)
PMV BLD AUTO: 9.8 FL (ref 8.9–12.9)
POTASSIUM SERPL-SCNC: 3.7 MMOL/L (ref 3.5–5.1)
POTASSIUM SERPL-SCNC: 3.8 MMOL/L (ref 3.5–5.1)
PROT SERPL-MCNC: 6.1 G/DL (ref 6.4–8.2)
PROT SERPL-MCNC: 7.2 G/DL (ref 6.4–8.2)
PROT UR STRIP-MCNC: NEGATIVE MG/DL
RBC # BLD AUTO: 3.81 M/UL (ref 3.8–5.2)
RBC #/AREA URNS HPF: ABNORMAL /HPF (ref 0–5)
SODIUM SERPL-SCNC: 136 MMOL/L (ref 136–145)
SODIUM SERPL-SCNC: 138 MMOL/L (ref 136–145)
SP GR UR REFRACTOMETRY: 1.02 (ref 1–1.03)
UROBILINOGEN UR QL STRIP.AUTO: 0.1 EU/DL (ref 0.1–1)
WBC # BLD AUTO: 8.2 K/UL (ref 3.6–11)
WBC URNS QL MICRO: ABNORMAL /HPF (ref 0–4)

## 2024-12-02 PROCEDURE — 96374 THER/PROPH/DIAG INJ IV PUSH: CPT

## 2024-12-02 PROCEDURE — 2580000003 HC RX 258: Performed by: OBSTETRICS & GYNECOLOGY

## 2024-12-02 PROCEDURE — G0378 HOSPITAL OBSERVATION PER HR: HCPCS

## 2024-12-02 PROCEDURE — 84703 CHORIONIC GONADOTROPIN ASSAY: CPT

## 2024-12-02 PROCEDURE — 2580000003 HC RX 258: Performed by: EMERGENCY MEDICINE

## 2024-12-02 PROCEDURE — 85025 COMPLETE CBC W/AUTO DIFF WBC: CPT

## 2024-12-02 PROCEDURE — 80053 COMPREHEN METABOLIC PANEL: CPT

## 2024-12-02 PROCEDURE — 6360000002 HC RX W HCPCS: Performed by: OBSTETRICS & GYNECOLOGY

## 2024-12-02 PROCEDURE — 76856 US EXAM PELVIC COMPLETE: CPT

## 2024-12-02 PROCEDURE — 76857 US EXAM PELVIC LIMITED: CPT

## 2024-12-02 PROCEDURE — 36415 COLL VENOUS BLD VENIPUNCTURE: CPT

## 2024-12-02 PROCEDURE — 6360000002 HC RX W HCPCS: Performed by: EMERGENCY MEDICINE

## 2024-12-02 PROCEDURE — 6370000000 HC RX 637 (ALT 250 FOR IP): Performed by: OBSTETRICS & GYNECOLOGY

## 2024-12-02 PROCEDURE — 81001 URINALYSIS AUTO W/SCOPE: CPT

## 2024-12-02 PROCEDURE — 99285 EMERGENCY DEPT VISIT HI MDM: CPT

## 2024-12-02 PROCEDURE — 96375 TX/PRO/DX INJ NEW DRUG ADDON: CPT

## 2024-12-02 PROCEDURE — 76770 US EXAM ABDO BACK WALL COMP: CPT

## 2024-12-02 RX ORDER — PROMETHAZINE HYDROCHLORIDE 12.5 MG/1
25 SUPPOSITORY RECTAL EVERY 6 HOURS PRN
Status: DISCONTINUED | OUTPATIENT
Start: 2024-12-02 | End: 2024-12-02

## 2024-12-02 RX ORDER — ONDANSETRON 2 MG/ML
4 INJECTION INTRAMUSCULAR; INTRAVENOUS EVERY 6 HOURS PRN
Status: DISCONTINUED | OUTPATIENT
Start: 2024-12-02 | End: 2024-12-05 | Stop reason: HOSPADM

## 2024-12-02 RX ORDER — ENOXAPARIN SODIUM 100 MG/ML
40 INJECTION SUBCUTANEOUS DAILY
Status: DISCONTINUED | OUTPATIENT
Start: 2024-12-02 | End: 2024-12-05 | Stop reason: HOSPADM

## 2024-12-02 RX ORDER — ACETAMINOPHEN 650 MG/1
650 SUPPOSITORY RECTAL EVERY 6 HOURS PRN
Status: DISCONTINUED | OUTPATIENT
Start: 2024-12-02 | End: 2024-12-05 | Stop reason: HOSPADM

## 2024-12-02 RX ORDER — SODIUM CHLORIDE 9 MG/ML
INJECTION, SOLUTION INTRAVENOUS PRN
Status: DISCONTINUED | OUTPATIENT
Start: 2024-12-02 | End: 2024-12-05 | Stop reason: HOSPADM

## 2024-12-02 RX ORDER — SODIUM CHLORIDE 9 MG/ML
INJECTION, SOLUTION INTRAVENOUS CONTINUOUS
Status: DISCONTINUED | OUTPATIENT
Start: 2024-12-02 | End: 2024-12-04

## 2024-12-02 RX ORDER — POTASSIUM CHLORIDE 7.45 MG/ML
10 INJECTION INTRAVENOUS PRN
Status: DISCONTINUED | OUTPATIENT
Start: 2024-12-02 | End: 2024-12-02 | Stop reason: CLARIF

## 2024-12-02 RX ORDER — HYDROMORPHONE HYDROCHLORIDE 2 MG/1
2 TABLET ORAL EVERY 4 HOURS PRN
Status: DISPENSED | OUTPATIENT
Start: 2024-12-02 | End: 2024-12-04

## 2024-12-02 RX ORDER — POTASSIUM CHLORIDE 1500 MG/1
40 TABLET, EXTENDED RELEASE ORAL PRN
Status: DISCONTINUED | OUTPATIENT
Start: 2024-12-02 | End: 2024-12-05 | Stop reason: HOSPADM

## 2024-12-02 RX ORDER — ONDANSETRON 4 MG/1
4 TABLET, ORALLY DISINTEGRATING ORAL EVERY 8 HOURS PRN
Status: DISCONTINUED | OUTPATIENT
Start: 2024-12-02 | End: 2024-12-05 | Stop reason: HOSPADM

## 2024-12-02 RX ORDER — ACETAMINOPHEN 325 MG/1
650 TABLET ORAL EVERY 6 HOURS PRN
Status: DISCONTINUED | OUTPATIENT
Start: 2024-12-02 | End: 2024-12-05 | Stop reason: HOSPADM

## 2024-12-02 RX ORDER — POLYETHYLENE GLYCOL 3350 17 G/17G
17 POWDER, FOR SOLUTION ORAL DAILY PRN
Status: DISCONTINUED | OUTPATIENT
Start: 2024-12-02 | End: 2024-12-05 | Stop reason: HOSPADM

## 2024-12-02 RX ORDER — SODIUM CHLORIDE 0.9 % (FLUSH) 0.9 %
5-40 SYRINGE (ML) INJECTION PRN
Status: DISCONTINUED | OUTPATIENT
Start: 2024-12-02 | End: 2024-12-05 | Stop reason: HOSPADM

## 2024-12-02 RX ORDER — SODIUM CHLORIDE 0.9 % (FLUSH) 0.9 %
5-40 SYRINGE (ML) INJECTION EVERY 12 HOURS SCHEDULED
Status: DISCONTINUED | OUTPATIENT
Start: 2024-12-02 | End: 2024-12-05 | Stop reason: HOSPADM

## 2024-12-02 RX ORDER — HYDROMORPHONE HYDROCHLORIDE 1 MG/ML
1 INJECTION, SOLUTION INTRAMUSCULAR; INTRAVENOUS; SUBCUTANEOUS
Status: COMPLETED | OUTPATIENT
Start: 2024-12-02 | End: 2024-12-02

## 2024-12-02 RX ORDER — 0.9 % SODIUM CHLORIDE 0.9 %
1000 INTRAVENOUS SOLUTION INTRAVENOUS ONCE
Status: COMPLETED | OUTPATIENT
Start: 2024-12-02 | End: 2024-12-02

## 2024-12-02 RX ORDER — ONDANSETRON 2 MG/ML
4 INJECTION INTRAMUSCULAR; INTRAVENOUS ONCE
Status: COMPLETED | OUTPATIENT
Start: 2024-12-02 | End: 2024-12-02

## 2024-12-02 RX ORDER — KETOROLAC TROMETHAMINE 15 MG/ML
15 INJECTION, SOLUTION INTRAMUSCULAR; INTRAVENOUS ONCE
Status: COMPLETED | OUTPATIENT
Start: 2024-12-02 | End: 2024-12-02

## 2024-12-02 RX ORDER — SODIUM CHLORIDE, SODIUM LACTATE, POTASSIUM CHLORIDE, CALCIUM CHLORIDE 600; 310; 30; 20 MG/100ML; MG/100ML; MG/100ML; MG/100ML
INJECTION, SOLUTION INTRAVENOUS CONTINUOUS
Status: DISCONTINUED | OUTPATIENT
Start: 2024-12-02 | End: 2024-12-02

## 2024-12-02 RX ORDER — MAGNESIUM SULFATE IN WATER 40 MG/ML
2000 INJECTION, SOLUTION INTRAVENOUS PRN
Status: DISCONTINUED | OUTPATIENT
Start: 2024-12-02 | End: 2024-12-05 | Stop reason: HOSPADM

## 2024-12-02 RX ORDER — PROMETHAZINE HYDROCHLORIDE 12.5 MG/1
25 SUPPOSITORY RECTAL ONCE
Status: COMPLETED | OUTPATIENT
Start: 2024-12-02 | End: 2024-12-02

## 2024-12-02 RX ADMIN — PROMETHAZINE HYDROCHLORIDE 25 MG: 12.5 SUPPOSITORY RECTAL at 21:16

## 2024-12-02 RX ADMIN — ONDANSETRON 4 MG: 2 INJECTION INTRAMUSCULAR; INTRAVENOUS at 19:43

## 2024-12-02 RX ADMIN — SODIUM CHLORIDE: 9 INJECTION, SOLUTION INTRAVENOUS at 18:14

## 2024-12-02 RX ADMIN — HYDROMORPHONE HYDROCHLORIDE 1 MG: 1 INJECTION, SOLUTION INTRAMUSCULAR; INTRAVENOUS; SUBCUTANEOUS at 12:57

## 2024-12-02 RX ADMIN — SODIUM CHLORIDE 1000 ML: 9 INJECTION, SOLUTION INTRAVENOUS at 12:58

## 2024-12-02 RX ADMIN — ONDANSETRON 4 MG: 2 INJECTION INTRAMUSCULAR; INTRAVENOUS at 12:58

## 2024-12-02 RX ADMIN — KETOROLAC TROMETHAMINE 15 MG: 15 INJECTION, SOLUTION INTRAMUSCULAR; INTRAVENOUS at 12:58

## 2024-12-02 ASSESSMENT — PAIN DESCRIPTION - LOCATION: LOCATION: FLANK

## 2024-12-02 ASSESSMENT — PAIN SCALES - GENERAL
PAINLEVEL_OUTOF10: 1
PAINLEVEL_OUTOF10: 1
PAINLEVEL_OUTOF10: 10
PAINLEVEL_OUTOF10: 1
PAINLEVEL_OUTOF10: 1

## 2024-12-02 ASSESSMENT — LIFESTYLE VARIABLES
HOW OFTEN DO YOU HAVE A DRINK CONTAINING ALCOHOL: NEVER
HOW OFTEN DO YOU HAVE A DRINK CONTAINING ALCOHOL: 2-4 TIMES A MONTH
HOW MANY STANDARD DRINKS CONTAINING ALCOHOL DO YOU HAVE ON A TYPICAL DAY: 1 OR 2
HOW MANY STANDARD DRINKS CONTAINING ALCOHOL DO YOU HAVE ON A TYPICAL DAY: PATIENT DOES NOT DRINK

## 2024-12-02 ASSESSMENT — PAIN - FUNCTIONAL ASSESSMENT
PAIN_FUNCTIONAL_ASSESSMENT: 0-10
PAIN_FUNCTIONAL_ASSESSMENT: 0-10

## 2024-12-02 ASSESSMENT — PAIN DESCRIPTION - ORIENTATION: ORIENTATION: RIGHT

## 2024-12-02 NOTE — CARE COORDINATION
12/02/24 1528   Service Assessment   Patient Orientation Alert and Oriented   Cognition Alert   History Provided By Patient   Primary Caregiver Self   Accompanied By/Relationship Mother Caro Kim   Support Systems Parent;Family Members;Children   Patient's Healthcare Decision Maker is: Legal Next of Kin   PCP Verified by CM Yes  (Travis Brandt - seen 2 weeks ago.)   Last Visit to PCP Within last 3 months   Prior Functional Level Independent in ADLs/IADLs   Current Functional Level Independent in ADLs/IADLs   Can patient return to prior living arrangement Yes   Ability to make needs known: Good   Family able to assist with home care needs: Yes   Would you like for me to discuss the discharge plan with any other family members/significant others, and if so, who? Yes  (Mother Caro Brandt)   Financial Resources Medicaid   Community Resources None   CM/SW Referral Other (see comment)  (None)   Social/Functional History   Lives With Parent   Type of Home House   Home Layout One level   Home Access Stairs to enter with rails   Entrance Stairs - Number of Steps 3   Bathroom Shower/Tub Tub/Shower unit   Bathroom Toilet Standard   Bathroom Equipment None   Bathroom Accessibility Accessible   Home Equipment None   Receives Help From Family   ADL Assistance Independent   Homemaking Assistance Independent   Homemaking Responsibilities Yes   Ambulation Assistance Independent   Transfer Assistance Independent   Active  Yes   Occupation Full time employment   Discharge Planning   Type of Residence House   Living Arrangements Children   Current Services Prior To Admission None   Potential Assistance Needed N/A   DME Ordered? No   Potential Assistance Purchasing Medications No   Type of Home Care Services None   Patient expects to be discharged to: House   One/Two Story Residence One story   History of falls? 0   Services At/After Discharge   Transition of Care Consult (CM Consult) Discharge Planning   Services

## 2024-12-02 NOTE — ED PROVIDER NOTES
Other (See Comments)     Swelling    Wheat Other (See Comments)     Swelling       PCP: Travis Brandt MD    Current Meds:   Current Facility-Administered Medications   Medication Dose Route Frequency Provider Last Rate Last Admin    ondansetron (ZOFRAN) injection 4 mg  4 mg IntraVENous Once Esther Motta MD        ketorolac (TORADOL) injection 15 mg  15 mg IntraVENous Once Esther Motta MD        HYDROmorphone HCl PF (DILAUDID) injection 1 mg  1 mg IntraVENous NOW Esther Motta MD        sodium chloride 0.9 % bolus 1,000 mL  1,000 mL IntraVENous Once Esther Motta MD         Current Outpatient Medications   Medication Sig Dispense Refill    oxyCODONE-acetaminophen (PERCOCET)  MG per tablet Take 1 tablet by mouth every 6 hours as needed for Pain for up to 3 days. Max Daily Amount: 4 tablets 12 tablet 0    ondansetron (ZOFRAN-ODT) 4 MG disintegrating tablet Take 1 tablet by mouth every 8 hours as needed for Nausea or Vomiting 12 tablet 0    ibuprofen (ADVIL;MOTRIN) 800 MG tablet Take 1 tablet by mouth every 8 hours as needed for Pain 12 tablet 0    ondansetron (ZOFRAN-ODT) 4 MG disintegrating tablet Take 1 tablet by mouth 3 times daily as needed for Nausea or Vomiting 21 tablet 0    FLUoxetine (PROZAC) 10 MG capsule       levothyroxine (SYNTHROID) 25 MCG tablet       pantoprazole (PROTONIX) 40 MG tablet       acetaminophen (TYLENOL) 325 MG tablet Take 650 mg by mouth every 6 hours as needed      Cetirizine HCl (ZYRTEC ALLERGY) 10 MG CAPS Take 10 mg by mouth      montelukast (SINGULAIR) 10 MG tablet Take 10 mg by mouth daily      omeprazole (PRILOSEC) 20 MG delayed release capsule Take 20 mg by mouth daily      SUMAtriptan (IMITREX) 100 MG tablet sumatriptan 100 mg tablet      SUMAtriptan Succinate Refill 6 MG/0.5ML SOCT ceived the following from [facility]: Outside name: SUMAtriptan succinate 6 mg/0.5 mL kit         Social Determinants of Health:   Social Determinants

## 2024-12-02 NOTE — ED TRIAGE NOTES
Pt was seen yesterday at [facility] standing, diagnosed with kidney stone  and a 7 cm hemorraghic ovarian cyst. Hydronephrosis. Home medications not helping, unable to manage pain at home, pain so severe pt keeps passing out    Alert oriented on arrival

## 2024-12-02 NOTE — ED NOTES
Pt transported to [address] [place] [address] with EDTs in stretcher, alert oriented in no acute distress at departure of ed. Iv in place and patent, family aware.

## 2024-12-03 LAB
BASOPHILS # BLD: 0 K/UL (ref 0–0.1)
BASOPHILS NFR BLD: 1 % (ref 0–1)
DIFFERENTIAL METHOD BLD: ABNORMAL
EOSINOPHIL # BLD: 0.1 K/UL (ref 0–0.4)
EOSINOPHIL NFR BLD: 2 % (ref 0–7)
ERYTHROCYTE [DISTWIDTH] IN BLOOD BY AUTOMATED COUNT: 13.2 % (ref 11.5–14.5)
HCT VFR BLD AUTO: 30.7 % (ref 35–47)
HGB BLD-MCNC: 10 G/DL (ref 11.5–16)
IMM GRANULOCYTES # BLD AUTO: 0 K/UL (ref 0–0.04)
IMM GRANULOCYTES NFR BLD AUTO: 0 % (ref 0–0.5)
LYMPHOCYTES # BLD: 2 K/UL (ref 0.8–3.5)
LYMPHOCYTES NFR BLD: 25 % (ref 12–49)
MCH RBC QN AUTO: 30.4 PG (ref 26–34)
MCHC RBC AUTO-ENTMCNC: 32.6 G/DL (ref 30–36.5)
MCV RBC AUTO: 93.3 FL (ref 80–99)
MONOCYTES # BLD: 0.7 K/UL (ref 0–1)
MONOCYTES NFR BLD: 9 % (ref 5–13)
NEUTS SEG # BLD: 5.1 K/UL (ref 1.8–8)
NEUTS SEG NFR BLD: 63 % (ref 32–75)
NRBC # BLD: 0 K/UL (ref 0–0.01)
NRBC BLD-RTO: 0 PER 100 WBC
PLATELET # BLD AUTO: 226 K/UL (ref 150–400)
PMV BLD AUTO: 9.7 FL (ref 8.9–12.9)
RBC # BLD AUTO: 3.29 M/UL (ref 3.8–5.2)
WBC # BLD AUTO: 8 K/UL (ref 3.6–11)

## 2024-12-03 PROCEDURE — 6370000000 HC RX 637 (ALT 250 FOR IP): Performed by: OBSTETRICS & GYNECOLOGY

## 2024-12-03 PROCEDURE — G0378 HOSPITAL OBSERVATION PER HR: HCPCS

## 2024-12-03 PROCEDURE — 36415 COLL VENOUS BLD VENIPUNCTURE: CPT

## 2024-12-03 PROCEDURE — 6360000002 HC RX W HCPCS: Performed by: OBSTETRICS & GYNECOLOGY

## 2024-12-03 PROCEDURE — 2580000003 HC RX 258: Performed by: OBSTETRICS & GYNECOLOGY

## 2024-12-03 PROCEDURE — 85025 COMPLETE CBC W/AUTO DIFF WBC: CPT

## 2024-12-03 PROCEDURE — 99222 1ST HOSP IP/OBS MODERATE 55: CPT | Performed by: UROLOGY

## 2024-12-03 RX ORDER — KETOROLAC TROMETHAMINE 30 MG/ML
30 INJECTION, SOLUTION INTRAMUSCULAR; INTRAVENOUS EVERY 6 HOURS
Status: DISCONTINUED | OUTPATIENT
Start: 2024-12-03 | End: 2024-12-05 | Stop reason: HOSPADM

## 2024-12-03 RX ADMIN — SODIUM CHLORIDE, PRESERVATIVE FREE 10 ML: 5 INJECTION INTRAVENOUS at 08:53

## 2024-12-03 RX ADMIN — ENOXAPARIN SODIUM 40 MG: 100 INJECTION SUBCUTANEOUS at 16:37

## 2024-12-03 RX ADMIN — KETOROLAC TROMETHAMINE 30 MG: 30 INJECTION, SOLUTION INTRAMUSCULAR at 13:23

## 2024-12-03 RX ADMIN — ACETAMINOPHEN 650 MG: 325 TABLET ORAL at 06:35

## 2024-12-03 RX ADMIN — SODIUM CHLORIDE, PRESERVATIVE FREE 10 ML: 5 INJECTION INTRAVENOUS at 20:16

## 2024-12-03 RX ADMIN — HYDROMORPHONE HYDROCHLORIDE 2 MG: 2 TABLET ORAL at 08:52

## 2024-12-03 RX ADMIN — KETOROLAC TROMETHAMINE 30 MG: 30 INJECTION, SOLUTION INTRAMUSCULAR at 17:38

## 2024-12-03 ASSESSMENT — PAIN SCALES - GENERAL
PAINLEVEL_OUTOF10: 7
PAINLEVEL_OUTOF10: 4
PAINLEVEL_OUTOF10: 4
PAINLEVEL_OUTOF10: 2
PAINLEVEL_OUTOF10: 3
PAINLEVEL_OUTOF10: 2
PAINLEVEL_OUTOF10: 6
PAINLEVEL_OUTOF10: 2

## 2024-12-03 ASSESSMENT — PAIN DESCRIPTION - ORIENTATION
ORIENTATION: RIGHT
ORIENTATION: RIGHT

## 2024-12-03 ASSESSMENT — PAIN DESCRIPTION - LOCATION
LOCATION: FLANK
LOCATION: FLANK

## 2024-12-03 ASSESSMENT — PAIN - FUNCTIONAL ASSESSMENT: PAIN_FUNCTIONAL_ASSESSMENT: ACTIVITIES ARE NOT PREVENTED

## 2024-12-03 ASSESSMENT — PAIN DESCRIPTION - DESCRIPTORS: DESCRIPTORS: ACHING;DISCOMFORT;THROBBING

## 2024-12-03 NOTE — CARE COORDINATION
CM reviewed chart.  Per IDRs and physician notes, patient is now pending urology consult.    Patient's current plan is to discharge home/self when medically clear.    CM will continue to follow.

## 2024-12-04 ENCOUNTER — APPOINTMENT (OUTPATIENT)
Facility: HOSPITAL | Age: 34
End: 2024-12-04
Payer: MEDICAID

## 2024-12-04 ENCOUNTER — ANESTHESIA EVENT (OUTPATIENT)
Facility: HOSPITAL | Age: 34
End: 2024-12-04
Payer: MEDICAID

## 2024-12-04 ENCOUNTER — ANESTHESIA (OUTPATIENT)
Facility: HOSPITAL | Age: 34
End: 2024-12-04
Payer: MEDICAID

## 2024-12-04 LAB
EKG ATRIAL RATE: 76 BPM
EKG DIAGNOSIS: NORMAL
EKG P AXIS: 38 DEGREES
EKG P-R INTERVAL: 160 MS
EKG Q-T INTERVAL: 376 MS
EKG QRS DURATION: 72 MS
EKG QTC CALCULATION (BAZETT): 423 MS
EKG R AXIS: 34 DEGREES
EKG T AXIS: 2 DEGREES
EKG VENTRICULAR RATE: 76 BPM
TROPONIN I SERPL HS-MCNC: 4 NG/L (ref 0–51)

## 2024-12-04 PROCEDURE — 84484 ASSAY OF TROPONIN QUANT: CPT

## 2024-12-04 PROCEDURE — 2580000003 HC RX 258: Performed by: STUDENT IN AN ORGANIZED HEALTH CARE EDUCATION/TRAINING PROGRAM

## 2024-12-04 PROCEDURE — 6370000000 HC RX 637 (ALT 250 FOR IP): Performed by: STUDENT IN AN ORGANIZED HEALTH CARE EDUCATION/TRAINING PROGRAM

## 2024-12-04 PROCEDURE — 84439 ASSAY OF FREE THYROXINE: CPT

## 2024-12-04 PROCEDURE — 93005 ELECTROCARDIOGRAM TRACING: CPT | Performed by: OBSTETRICS & GYNECOLOGY

## 2024-12-04 PROCEDURE — G0378 HOSPITAL OBSERVATION PER HR: HCPCS

## 2024-12-04 PROCEDURE — 6360000002 HC RX W HCPCS: Performed by: NURSE ANESTHETIST, CERTIFIED REGISTERED

## 2024-12-04 PROCEDURE — 76000 FLUOROSCOPY <1 HR PHYS/QHP: CPT

## 2024-12-04 PROCEDURE — 6360000002 HC RX W HCPCS: Performed by: OBSTETRICS & GYNECOLOGY

## 2024-12-04 PROCEDURE — 71045 X-RAY EXAM CHEST 1 VIEW: CPT

## 2024-12-04 PROCEDURE — 36415 COLL VENOUS BLD VENIPUNCTURE: CPT

## 2024-12-04 PROCEDURE — 6360000002 HC RX W HCPCS: Performed by: STUDENT IN AN ORGANIZED HEALTH CARE EDUCATION/TRAINING PROGRAM

## 2024-12-04 PROCEDURE — 2580000003 HC RX 258: Performed by: NURSE ANESTHETIST, CERTIFIED REGISTERED

## 2024-12-04 PROCEDURE — 2580000003 HC RX 258: Performed by: OBSTETRICS & GYNECOLOGY

## 2024-12-04 PROCEDURE — 6360000004 HC RX CONTRAST MEDICATION: Performed by: STUDENT IN AN ORGANIZED HEALTH CARE EDUCATION/TRAINING PROGRAM

## 2024-12-04 RX ORDER — NALOXONE HYDROCHLORIDE 0.4 MG/ML
INJECTION, SOLUTION INTRAMUSCULAR; INTRAVENOUS; SUBCUTANEOUS PRN
Status: DISCONTINUED | OUTPATIENT
Start: 2024-12-04 | End: 2024-12-04 | Stop reason: HOSPADM

## 2024-12-04 RX ORDER — OXYCODONE HYDROCHLORIDE 5 MG/1
5 TABLET ORAL PRN
Status: DISCONTINUED | OUTPATIENT
Start: 2024-12-04 | End: 2024-12-04 | Stop reason: HOSPADM

## 2024-12-04 RX ORDER — SODIUM CHLORIDE 0.9 % (FLUSH) 0.9 %
5-40 SYRINGE (ML) INJECTION PRN
Status: DISCONTINUED | OUTPATIENT
Start: 2024-12-04 | End: 2024-12-04 | Stop reason: HOSPADM

## 2024-12-04 RX ORDER — IOPAMIDOL 755 MG/ML
INJECTION, SOLUTION INTRAVASCULAR PRN
Status: DISCONTINUED | OUTPATIENT
Start: 2024-12-04 | End: 2024-12-04 | Stop reason: ALTCHOICE

## 2024-12-04 RX ORDER — FLUOXETINE 10 MG/1
10 CAPSULE ORAL DAILY
Status: DISCONTINUED | OUTPATIENT
Start: 2024-12-04 | End: 2024-12-05 | Stop reason: HOSPADM

## 2024-12-04 RX ORDER — SODIUM CHLORIDE 9 MG/ML
INJECTION, SOLUTION INTRAVENOUS PRN
Status: DISCONTINUED | OUTPATIENT
Start: 2024-12-04 | End: 2024-12-04 | Stop reason: HOSPADM

## 2024-12-04 RX ORDER — DIPHENHYDRAMINE HYDROCHLORIDE 50 MG/ML
12.5 INJECTION INTRAMUSCULAR; INTRAVENOUS
Status: DISCONTINUED | OUTPATIENT
Start: 2024-12-04 | End: 2024-12-04 | Stop reason: HOSPADM

## 2024-12-04 RX ORDER — LORAZEPAM 2 MG/ML
0.5 INJECTION INTRAMUSCULAR
Status: DISCONTINUED | OUTPATIENT
Start: 2024-12-04 | End: 2024-12-04 | Stop reason: HOSPADM

## 2024-12-04 RX ORDER — METOCLOPRAMIDE HYDROCHLORIDE 5 MG/ML
10 INJECTION INTRAMUSCULAR; INTRAVENOUS
Status: DISCONTINUED | OUTPATIENT
Start: 2024-12-04 | End: 2024-12-04 | Stop reason: HOSPADM

## 2024-12-04 RX ORDER — MEPERIDINE HYDROCHLORIDE 25 MG/ML
12.5 INJECTION INTRAMUSCULAR; INTRAVENOUS; SUBCUTANEOUS EVERY 5 MIN PRN
Status: DISCONTINUED | OUTPATIENT
Start: 2024-12-04 | End: 2024-12-04 | Stop reason: HOSPADM

## 2024-12-04 RX ORDER — SODIUM CHLORIDE 0.9 % (FLUSH) 0.9 %
5-40 SYRINGE (ML) INJECTION EVERY 12 HOURS SCHEDULED
Status: DISCONTINUED | OUTPATIENT
Start: 2024-12-04 | End: 2024-12-04 | Stop reason: HOSPADM

## 2024-12-04 RX ORDER — SODIUM CHLORIDE, SODIUM LACTATE, POTASSIUM CHLORIDE, CALCIUM CHLORIDE 600; 310; 30; 20 MG/100ML; MG/100ML; MG/100ML; MG/100ML
INJECTION, SOLUTION INTRAVENOUS ONCE
Status: DISCONTINUED | OUTPATIENT
Start: 2024-12-04 | End: 2024-12-04 | Stop reason: HOSPADM

## 2024-12-04 RX ORDER — LIDOCAINE 4 G/G
1 PATCH TOPICAL AS NEEDED
Status: DISCONTINUED | OUTPATIENT
Start: 2024-12-04 | End: 2024-12-04 | Stop reason: HOSPADM

## 2024-12-04 RX ORDER — IPRATROPIUM BROMIDE AND ALBUTEROL SULFATE 2.5; .5 MG/3ML; MG/3ML
1 SOLUTION RESPIRATORY (INHALATION)
Status: DISCONTINUED | OUTPATIENT
Start: 2024-12-04 | End: 2024-12-04 | Stop reason: HOSPADM

## 2024-12-04 RX ORDER — MIDAZOLAM HYDROCHLORIDE 1 MG/ML
INJECTION, SOLUTION INTRAMUSCULAR; INTRAVENOUS
Status: DISCONTINUED | OUTPATIENT
Start: 2024-12-04 | End: 2024-12-04 | Stop reason: SDUPTHER

## 2024-12-04 RX ORDER — ONDANSETRON 2 MG/ML
4 INJECTION INTRAMUSCULAR; INTRAVENOUS
Status: DISCONTINUED | OUTPATIENT
Start: 2024-12-04 | End: 2024-12-04 | Stop reason: HOSPADM

## 2024-12-04 RX ORDER — ONDANSETRON 2 MG/ML
INJECTION INTRAMUSCULAR; INTRAVENOUS
Status: DISCONTINUED | OUTPATIENT
Start: 2024-12-04 | End: 2024-12-04 | Stop reason: SDUPTHER

## 2024-12-04 RX ORDER — LEVOTHYROXINE SODIUM 25 UG/1
25 TABLET ORAL DAILY
Status: DISCONTINUED | OUTPATIENT
Start: 2024-12-05 | End: 2024-12-05 | Stop reason: HOSPADM

## 2024-12-04 RX ORDER — LABETALOL HYDROCHLORIDE 5 MG/ML
10 INJECTION, SOLUTION INTRAVENOUS
Status: DISCONTINUED | OUTPATIENT
Start: 2024-12-04 | End: 2024-12-04 | Stop reason: HOSPADM

## 2024-12-04 RX ORDER — HYDRALAZINE HYDROCHLORIDE 20 MG/ML
10 INJECTION INTRAMUSCULAR; INTRAVENOUS
Status: DISCONTINUED | OUTPATIENT
Start: 2024-12-04 | End: 2024-12-04 | Stop reason: HOSPADM

## 2024-12-04 RX ORDER — MONTELUKAST SODIUM 10 MG/1
10 TABLET ORAL DAILY
Status: DISCONTINUED | OUTPATIENT
Start: 2024-12-04 | End: 2024-12-05 | Stop reason: HOSPADM

## 2024-12-04 RX ORDER — FENTANYL CITRATE 0.05 MG/ML
50 INJECTION, SOLUTION INTRAMUSCULAR; INTRAVENOUS EVERY 5 MIN PRN
Status: DISCONTINUED | OUTPATIENT
Start: 2024-12-04 | End: 2024-12-04 | Stop reason: HOSPADM

## 2024-12-04 RX ORDER — PANTOPRAZOLE SODIUM 40 MG/1
40 TABLET, DELAYED RELEASE ORAL
Status: DISCONTINUED | OUTPATIENT
Start: 2024-12-05 | End: 2024-12-05 | Stop reason: HOSPADM

## 2024-12-04 RX ORDER — GLUCAGON 1 MG/ML
1 KIT INJECTION PRN
Status: DISCONTINUED | OUTPATIENT
Start: 2024-12-04 | End: 2024-12-04 | Stop reason: HOSPADM

## 2024-12-04 RX ORDER — SODIUM CHLORIDE, SODIUM LACTATE, POTASSIUM CHLORIDE, CALCIUM CHLORIDE 600; 310; 30; 20 MG/100ML; MG/100ML; MG/100ML; MG/100ML
INJECTION, SOLUTION INTRAVENOUS
Status: DISCONTINUED | OUTPATIENT
Start: 2024-12-04 | End: 2024-12-04 | Stop reason: SDUPTHER

## 2024-12-04 RX ORDER — OXYCODONE HYDROCHLORIDE 5 MG/1
10 TABLET ORAL PRN
Status: DISCONTINUED | OUTPATIENT
Start: 2024-12-04 | End: 2024-12-04 | Stop reason: HOSPADM

## 2024-12-04 RX ORDER — DEXTROSE MONOHYDRATE 100 MG/ML
INJECTION, SOLUTION INTRAVENOUS CONTINUOUS PRN
Status: DISCONTINUED | OUTPATIENT
Start: 2024-12-04 | End: 2024-12-04 | Stop reason: HOSPADM

## 2024-12-04 RX ORDER — DEXAMETHASONE SODIUM PHOSPHATE 4 MG/ML
INJECTION, SOLUTION INTRA-ARTICULAR; INTRALESIONAL; INTRAMUSCULAR; INTRAVENOUS; SOFT TISSUE
Status: DISCONTINUED | OUTPATIENT
Start: 2024-12-04 | End: 2024-12-04 | Stop reason: SDUPTHER

## 2024-12-04 RX ORDER — FENTANYL CITRATE 50 UG/ML
INJECTION, SOLUTION INTRAMUSCULAR; INTRAVENOUS
Status: DISCONTINUED | OUTPATIENT
Start: 2024-12-04 | End: 2024-12-04 | Stop reason: SDUPTHER

## 2024-12-04 RX ORDER — HYDROMORPHONE HYDROCHLORIDE 1 MG/ML
0.5 INJECTION, SOLUTION INTRAMUSCULAR; INTRAVENOUS; SUBCUTANEOUS EVERY 5 MIN PRN
Status: DISCONTINUED | OUTPATIENT
Start: 2024-12-04 | End: 2024-12-04 | Stop reason: HOSPADM

## 2024-12-04 RX ADMIN — KETOROLAC TROMETHAMINE 30 MG: 30 INJECTION, SOLUTION INTRAMUSCULAR at 23:00

## 2024-12-04 RX ADMIN — KETOROLAC TROMETHAMINE 30 MG: 30 INJECTION, SOLUTION INTRAMUSCULAR at 06:29

## 2024-12-04 RX ADMIN — FENTANYL CITRATE 100 MCG: 50 INJECTION INTRAMUSCULAR; INTRAVENOUS at 13:28

## 2024-12-04 RX ADMIN — SODIUM CHLORIDE, PRESERVATIVE FREE 10 ML: 5 INJECTION INTRAVENOUS at 21:45

## 2024-12-04 RX ADMIN — FLUOXETINE HYDROCHLORIDE 10 MG: 10 CAPSULE ORAL at 17:30

## 2024-12-04 RX ADMIN — ENOXAPARIN SODIUM 40 MG: 100 INJECTION SUBCUTANEOUS at 16:26

## 2024-12-04 RX ADMIN — SODIUM CHLORIDE, POTASSIUM CHLORIDE, SODIUM LACTATE AND CALCIUM CHLORIDE: 600; 310; 30; 20 INJECTION, SOLUTION INTRAVENOUS at 13:25

## 2024-12-04 RX ADMIN — CEFAZOLIN 2000 MG: 1 INJECTION, POWDER, FOR SOLUTION INTRAMUSCULAR; INTRAVENOUS at 13:32

## 2024-12-04 RX ADMIN — KETOROLAC TROMETHAMINE 30 MG: 30 INJECTION, SOLUTION INTRAMUSCULAR at 00:19

## 2024-12-04 RX ADMIN — MONTELUKAST 10 MG: 10 TABLET, FILM COATED ORAL at 16:26

## 2024-12-04 RX ADMIN — MIDAZOLAM 2 MG: 1 INJECTION INTRAMUSCULAR; INTRAVENOUS at 13:25

## 2024-12-04 RX ADMIN — KETOROLAC TROMETHAMINE 30 MG: 30 INJECTION, SOLUTION INTRAMUSCULAR at 17:30

## 2024-12-04 RX ADMIN — ONDANSETRON 4 MG: 2 INJECTION INTRAMUSCULAR; INTRAVENOUS at 13:44

## 2024-12-04 RX ADMIN — DEXAMETHASONE SODIUM PHOSPHATE 4 MG: 4 INJECTION INTRA-ARTICULAR; INTRALESIONAL; INTRAMUSCULAR; INTRAVENOUS; SOFT TISSUE at 13:44

## 2024-12-04 ASSESSMENT — PAIN DESCRIPTION - ORIENTATION: ORIENTATION: LOWER

## 2024-12-04 ASSESSMENT — PAIN SCALES - GENERAL
PAINLEVEL_OUTOF10: 3
PAINLEVEL_OUTOF10: 4
PAINLEVEL_OUTOF10: 1
PAINLEVEL_OUTOF10: 2

## 2024-12-04 ASSESSMENT — PAIN DESCRIPTION - LOCATION: LOCATION: BACK

## 2024-12-04 NOTE — PLAN OF CARE
Problem: Pain  Goal: Verbalizes/displays adequate comfort level or baseline comfort level  12/3/2024 2355 by Natasha Connell RN  Outcome: Progressing  12/3/2024 1001 by Honey Lynn RN  Outcome: Progressing     Problem: Discharge Planning  Goal: Discharge to home or other facility with appropriate resources  12/3/2024 2355 by Natasha Connell RN  Outcome: Progressing  Flowsheets (Taken 12/3/2024 2100)  Discharge to home or other facility with appropriate resources: Identify barriers to discharge with patient and caregiver  12/3/2024 1001 by Honey Lynn RN  Outcome: Progressing     Problem: Safety - Adult  Goal: Free from fall injury  12/3/2024 2355 by Natasha Connell RN  Outcome: Progressing  12/3/2024 1001 by Honey Lynn RN  Outcome: Progressing     Problem: ABCDS Injury Assessment  Goal: Absence of physical injury  12/3/2024 2355 by Natasha Connell RN  Outcome: Progressing  12/3/2024 1001 by Honey Lynn RN  Outcome: Progressing

## 2024-12-04 NOTE — OP NOTE
Operative Note      Patient: Landy Kim  YOB: 1990  MRN: [id number]    Date of Procedure: 12/4/2024    Pre-Op Diagnosis Codes:      * Kidney stone [N20.0]    Post-Op Diagnosis: Same       Procedure(s):  CYSTOURETHROSCOPY, RIGHT URETEROSCOPY, URETERAL DILATION AND URETERAL STENT PLACEMENT    Surgeon(s):  Chirag Nolan MD    Assistant:   * No surgical staff found *    Anesthesia: General    Estimated Blood Loss (mL): Minimal    Complications: None    Specimens:   * No specimens in log *    Implants:  Implant Name Type Inv. Item Serial No.  Lot No. LRB No. Used Action   STENT URET 6FR L26CM HYDR+ PGTL TAPR TIP GRAD BLDR MRK LO - SNA  STENT URET 6FR L26CM HYDR+ PGTL TAPR TIP GRAD BLDR MRK LO NA [employer]- [id number] Right 1 Implanted         Drains: * No LDAs found *    Findings:  Infection Present At Time Of Surgery (PATOS) (choose all levels that have infection present):  No infection present  Other Findings: Ureteral Stricture in the mid/distal ureter which was gently dilated with the scope, no stone present. A 6Fr x 26 cm JJ ureteral stent was placed into the right ureter and collecting system    Interpretation of retrograde pyelogram: redundant ureter and ureteral dilation secondary ureteral stricture and extrinsic compression from large ovarian cyst, placement of 26cm x 6 Fr JJ ureteral stent    Detailed Description of Procedure:   Patient was taken to the procedure room and placed on the supine position and the table.  Anesthesia was induced and perioperative antibiotics were administered.  The patient was then transferred to the dorsolithotomy position where he was prepped and draped in the usual sterile fashion.  Began the procedure by inserting a rigid cystoscope into the patient's bladder and performing a cystoscopy which revealed normal-appearing bladder mucosa.  A sensor wire was placed into the right ureter up into the right collecting system.  A semirigid

## 2024-12-04 NOTE — CONSULTS
UROLOGY CONSULT    Ryland Viera MD  [phone] Office    Patient: Landy Kim MRN: [id number]  SSN: [id number]    YOB: 1990  Age: 34 y.o.  Sex: female          Date of Encounter:  December 3, 2024  ADMITTED: 2024  for Right lower quadrant abdominal pain [R10.31]  Chief Complaint:  right lower quadrant pain  Reason for consult: hydronephrosis             History of Present Illness:  Patient is a 34 y.o. female admitted 2024 to the hospital for Right lower quadrant abdominal pain [R10.31].     She had right flank pain radiating to the RLQ yesterday, severe.  Improved today.  Associated N/V.  Tolerating PO today.    Per Dr. Blaire Orozco:   Patient reports hx of endometriosis. Not on OCP due to hx of migraines and Arnold-chiari malformation. Cyst first noted 4 weeks ago. LMP 12/13 so has persisted past menses. Had prior CT in August that showed a left ovarian cyst which has since resolved. Had planned to have removed by Dr. Elder at .     She states she had RLQ pain last month and a few days later started having right flank pain.      Past Medical History:  Allergies   Allergen Reactions    Barley Grass Other (See Comments)     Swelling     Shellfish Allergy      Other reaction(s): Unknown (comments)    Soy Other (See Comments)     Swelling    Wheat Other (See Comments)     Swelling         PMHx:  has a past medical history of Arnold-Chiari malformation (HCC), Endometriosis, GERD (gastroesophageal reflux disease), Hashimoto's disease, KEYUR (obstructive sleep apnea), Scoliosis, and Seasonal allergic rhinitis.   PSurgHx:  has a past surgical history that includes Appendectomy; Tonsillectomy; and  section.  PSocHx:  reports that she has quit smoking. Her smoking use included cigarettes. She has never used smokeless tobacco. She reports that she does not currently use alcohol. She reports that she does not use drugs.   FamilyHx:   Family History   Problem Relation 
Gyn Consult    Subjective:     Landy Kim is a 34 y.o.  premenopausal female who is being seen for right ovarian cyst and pelvic pain. Seen by private GYN and followed for cystic changes of right ovary.     OB/GYN ROS: normal menses, no abnormal bleeding, pelvic pain or discharge, no vaginal bleeding, no discharge or pelvic pain  OB/GYN history: right ovarian cyst for 3 weeks by report    Patient Active Problem List    Diagnosis Date Noted    Right lower quadrant abdominal pain 2024     Past Medical History:   Diagnosis Date    Arnold-Chiari malformation (HCC)     Endometriosis     GERD (gastroesophageal reflux disease)     Hashimoto's disease     KEYUR (obstructive sleep apnea)     Scoliosis     Seasonal allergic rhinitis       Past Surgical History:   Procedure Laterality Date    APPENDECTOMY       SECTION      TONSILLECTOMY        Social History     Tobacco Use    Smoking status: Former     Current packs/day: 0.25     Types: Cigarettes    Smokeless tobacco: Never   Substance Use Topics    Alcohol use: Not Currently      Family History   Problem Relation Age of Onset    Lupus Mother     Cancer Other         father's uncles - stomach cancer      Prior to Admission Medications   Prescriptions Last Dose Informant Patient Reported? Taking?   Cetirizine HCl (ZYRTEC ALLERGY) 10 MG CAPS   Yes No   Sig: Take 10 mg by mouth   FLUoxetine (PROZAC) 10 MG capsule   Yes No   SUMAtriptan (IMITREX) 100 MG tablet   Yes No   Sig: sumatriptan 100 mg tablet   SUMAtriptan Succinate Refill 6 MG/0.5ML SOCT   Yes No   Sig: ceived the following from [facility]: Outside name: SUMAtriptan succinate 6 mg/0.5 mL kit   acetaminophen (TYLENOL) 325 MG tablet   Yes No   Sig: Take 650 mg by mouth every 6 hours as needed   ibuprofen (ADVIL;MOTRIN) 800 MG tablet   No No   Sig: Take 1 tablet by mouth every 8 hours as needed for Pain   levothyroxine (SYNTHROID) 25 MCG tablet   Yes No   montelukast 
emergency department and that patient warrants admission to hospital.     Assessment & plan:     Chest pain  JOSIANE score 1  Unstable angina  -- Troponin X2   --CXR  -- EKG  - If troponin is elevated, will consult cardiology.          Right lower quadrant abdominal pain likely 2/2 right hemorrhagic cyst looks like blood clott  History of Arnold-Chiari malformation  Endometriosis    S/p ureteroscopy  S/p cystourethroscopy  S/p retrograde pyelograms  S/p ureteral dilatation and ureteral stent placement per urology    --OB/GYN primary  - Urology following      Hashimoto's disease  Started on home Synthroid 25 mcg daily  - Will repeat T4 and TSH        CODE STATUS:  Full Code     Social determinants of health: None known      Home medications were reviewed    Signed By: Valeria Goldsmith MD     December 4, 2024

## 2024-12-04 NOTE — ANESTHESIA PRE PROCEDURE
Department of Anesthesiology  Preprocedure Note       Name:  Landy Kim   Age:  34 y.o.  :  1990                                          MRN:  [id number]         Date:  2024      Surgeon: Surgeon(s):  Chirag Nolan MD    Procedure: Procedure(s):  CYSTOURETHROSCOPY, RIGHT URETEROSCOPY, URETERAL DILATION AND URETERAL STENT PLACEMENT    Medications prior to admission:   Prior to Admission medications    Medication Sig Start Date End Date Taking? Authorizing Provider   biotin (CASSY/VITAJOY) 2500 MCG CHEW chewable gummie Take 1 each by mouth daily   Yes ProviderLorna MD   oxyCODONE-acetaminophen (PERCOCET)  MG per tablet Take 1 tablet by mouth every 6 hours as needed for Pain for up to 3 days. Max Daily Amount: 4 tablets 24 Yes David Caballero DO   FLUoxetine (PROZAC) 10 MG capsule  23  Yes ProviderLorna MD   levothyroxine (SYNTHROID) 25 MCG tablet  4/10/24  Yes ProviderLorna MD   pantoprazole (PROTONIX) 40 MG tablet  23  Yes Provider, MD Lorna   montelukast (SINGULAIR) 10 MG tablet Take 1 tablet by mouth daily   Yes Automatic Reconciliation, Ar   ondansetron (ZOFRAN-ODT) 4 MG disintegrating tablet Take 1 tablet by mouth every 8 hours as needed for Nausea or Vomiting 24  David Caballero DO   ibuprofen (ADVIL;MOTRIN) 800 MG tablet Take 1 tablet by mouth every 8 hours as needed for Pain 24  Alfonso James, DO   ondansetron (ZOFRAN-ODT) 4 MG disintegrating tablet Take 1 tablet by mouth 3 times daily as needed for Nausea or Vomiting 8/15/24   Bernard Whipple MD   acetaminophen (TYLENOL) 325 MG tablet Take 650 mg by mouth every 6 hours as needed 22   Automatic Reconciliation, Ar   Cetirizine HCl (ZYRTEC ALLERGY) 10 MG CAPS Take 10 mg by mouth  Patient not taking: Reported on 2024    Automatic Reconciliation, Ar   omeprazole (PRILOSEC) 20 MG delayed release capsule Take 20 mg by mouth

## 2024-12-04 NOTE — CARE COORDINATION
CM reviewed chart.  Per urology note, patient is scheduled for procedure on Thursday, 12/5/24.    Patient's current plan is to discharge home/self when medically clear.    CM will continue to follow.

## 2024-12-05 VITALS
DIASTOLIC BLOOD PRESSURE: 72 MMHG | OXYGEN SATURATION: 100 % | HEART RATE: 62 BPM | RESPIRATION RATE: 16 BRPM | TEMPERATURE: 98.7 F | WEIGHT: 150 LBS | SYSTOLIC BLOOD PRESSURE: 111 MMHG | BODY MASS INDEX: 26.58 KG/M2 | HEIGHT: 63 IN

## 2024-12-05 PROBLEM — D25.9 FIBROID UTERUS: Status: ACTIVE | Noted: 2024-12-01

## 2024-12-05 PROBLEM — N83.201 RIGHT OVARIAN CYST: Status: ACTIVE | Noted: 2024-12-01

## 2024-12-05 PROBLEM — N20.0 KIDNEY STONE ON RIGHT SIDE: Status: ACTIVE | Noted: 2024-12-01

## 2024-12-05 PROBLEM — D25.9 FIBROID UTERUS: Status: ACTIVE | Noted: 2024-12-05

## 2024-12-05 LAB — T4 FREE SERPL-MCNC: 0.9 NG/DL (ref 0.8–1.5)

## 2024-12-05 PROCEDURE — 99232 SBSQ HOSP IP/OBS MODERATE 35: CPT | Performed by: NURSE PRACTITIONER

## 2024-12-05 PROCEDURE — 6370000000 HC RX 637 (ALT 250 FOR IP): Performed by: STUDENT IN AN ORGANIZED HEALTH CARE EDUCATION/TRAINING PROGRAM

## 2024-12-05 PROCEDURE — G0378 HOSPITAL OBSERVATION PER HR: HCPCS

## 2024-12-05 PROCEDURE — 6360000002 HC RX W HCPCS: Performed by: OBSTETRICS & GYNECOLOGY

## 2024-12-05 RX ORDER — IBUPROFEN 800 MG/1
800 TABLET, FILM COATED ORAL EVERY 8 HOURS PRN
Qty: 30 TABLET | Refills: 0 | Status: SHIPPED | OUTPATIENT
Start: 2024-12-05 | End: 2024-12-05 | Stop reason: HOSPADM

## 2024-12-05 RX ORDER — OXYBUTYNIN CHLORIDE 10 MG/1
10 TABLET, EXTENDED RELEASE ORAL DAILY
Qty: 15 TABLET | Refills: 0 | Status: SHIPPED | OUTPATIENT
Start: 2024-12-05

## 2024-12-05 RX ORDER — HYDROCODONE BITARTRATE AND ACETAMINOPHEN 5; 325 MG/1; MG/1
1 TABLET ORAL EVERY 4 HOURS PRN
Qty: 10 TABLET | Refills: 0 | Status: SHIPPED | OUTPATIENT
Start: 2024-12-05 | End: 2024-12-10

## 2024-12-05 RX ADMIN — FLUOXETINE HYDROCHLORIDE 10 MG: 10 CAPSULE ORAL at 10:04

## 2024-12-05 RX ADMIN — LEVOTHYROXINE SODIUM 25 MCG: 0.03 TABLET ORAL at 06:16

## 2024-12-05 RX ADMIN — MONTELUKAST 10 MG: 10 TABLET, FILM COATED ORAL at 10:04

## 2024-12-05 RX ADMIN — PANTOPRAZOLE SODIUM 40 MG: 40 TABLET, DELAYED RELEASE ORAL at 06:16

## 2024-12-05 RX ADMIN — KETOROLAC TROMETHAMINE 30 MG: 30 INJECTION, SOLUTION INTRAMUSCULAR at 06:16

## 2024-12-05 ASSESSMENT — PAIN DESCRIPTION - ORIENTATION: ORIENTATION: RIGHT

## 2024-12-05 ASSESSMENT — PAIN DESCRIPTION - DESCRIPTORS: DESCRIPTORS: SORE

## 2024-12-05 ASSESSMENT — PAIN SCALES - GENERAL
PAINLEVEL_OUTOF10: 2
PAINLEVEL_OUTOF10: 2

## 2024-12-05 ASSESSMENT — PAIN DESCRIPTION - LOCATION: LOCATION: ABDOMEN

## 2024-12-05 NOTE — PROGRESS NOTES
4 Eyes Skin Assessment     NAME:  Landy Kim  YOB: 1990  MEDICAL RECORD NUMBER:  [id number]    The patient is being assessed for  Admission    I agree that at least one RN has performed a thorough Head to Toe Skin Assessment on the patient. ALL assessment sites listed below have been assessed.      Areas assessed by both nurses:    Head, Face, Ears, Shoulders, Back, Chest, Arms, Elbows, Hands, Sacrum. Buttock, Coccyx, Ischium, Legs. Feet and Heels, and Under Medical Devices         Does the Patient have a Wound? No noted wound(s)       Biju Prevention initiated by RN: No  Wound Care Orders initiated by RN: No    Pressure Injury (Stage 3,4, Unstageable, DTI, NWPT, and Complex wounds) if present, place Wound referral order by RN under : No    New Ostomies, if present place, Ostomy referral order under : No     Nurse 1 eSignature: Electronically signed by Kathy Ivan RN on 12/2/24 at 4:14 PM EST    **SHARE this note so that the co-signing nurse can place an eSignature**    Nurse 2 eSignature: Electronically signed by Maki Gloria RN on 12/2/24 at 6:32 PM EST   
Gyn Progress Note    Name: Landy Kim MRN: [id number]  SSN: [id number]    YOB: 1990  Age: 34 y.o.  Sex: female      Chief Complaint   Patient presents with    Flank Pain       HPI 35 yo F with h/o endometriosis, uterine fibroids, right ovarian cyst (hemorrhagic vs endometrioma), admitted for right sided flank and RLQ pain.   POD#1 s/p CYSTOURETHROSCOPY, RIGHT URETEROSCOPY, URETERAL DILATION AND URETERAL STENT PLACEMENT     HD# 4    S: Pt's pain is 2/10, soreness at sight of stent placement. Percy reg diet. Denies n/v. Some hematuria.       Review of Systems   Constitutional:  Negative for fever.       Objective:     Vitals:  Vitals:    12/04/24 1445 12/04/24 1545 12/04/24 2133 12/05/24 0305   BP: 117/72 116/75 112/64 106/60   Pulse: 54 56 77 60   Resp: 13 16 16 17   Temp: 98.6 °F (37 °C) 98.2 °F (36.8 °C) 98.6 °F (37 °C) 98.4 °F (36.9 °C)   TempSrc: Oral Oral Oral Oral   SpO2: 95% 100% 100% 100%   Weight:       Height:            Scheduled Meds:   levothyroxine  25 mcg Oral Daily    pantoprazole  40 mg Oral QAM AC    montelukast  10 mg Oral Daily    FLUoxetine  10 mg Oral Daily    [Held by provider] ketorolac  30 mg IntraVENous Q6H    sodium chloride flush  5-40 mL IntraVENous 2 times per day    [Held by provider] enoxaparin  40 mg SubCUTAneous Daily     Continuous Infusions:   sodium chloride       PRN Meds:.sodium chloride flush, sodium chloride, potassium chloride **OR** potassium alternative oral replacement **OR** [DISCONTINUED] potassium chloride, magnesium sulfate, ondansetron **OR** ondansetron, polyethylene glycol, acetaminophen **OR** acetaminophen      Physical Exam  Constitutional:       General: She is not in acute distress.     Appearance: She is not ill-appearing.   Genitourinary:      Genitourinary Comments:   Female Genitalia: Bladder/Urethra: no urethral discharge or mass and normal meatus and bladder non distended. Vagina no tenderness, erythema, cystocele, rectocele, abnormal 
Gynecology Progress Note    Admission day 4. Went for stent placement on right today with urology.  Feels better after placement.      Vitals:  Blood pressure 106/60, pulse 60, temperature 98.4 °F (36.9 °C), temperature source Oral, resp. rate 17, height 1.6 m (5' 3\"), weight 68 kg (150 lb), last menstrual period 2024, SpO2 100%.  Temp (24hrs), Av.4 °F (36.9 °C), Min:97.6 °F (36.4 °C), Max:98.9 °F (37.2 °C)      Intake/Output Summary (Last 24 hours) at 2024 0332  Last data filed at 2024 0224  Gross per 24 hour   Intake 2105.79 ml   Output --   Net 2105.79 ml        Exam:  Patient without distress.               Abdomen soft,  nontender.                 Lower extremities are negative for swelling, cords, or tenderness.    Lab results:   Recent Results (from the past 24 hour(s))   EKG 12 Lead    Collection Time: 24  8:55 AM   Result Value Ref Range    Ventricular Rate 76 BPM    Atrial Rate 76 BPM    P-R Interval 160 ms    QRS Duration 72 ms    Q-T Interval 376 ms    QTc Calculation (Bazett) 423 ms    P Axis 38 degrees    R Axis 34 degrees    T Axis 2 degrees    Diagnosis       Normal sinus rhythm  Cannot rule out Anterior infarct , age undetermined  Abnormal ECG    Confirmed by Barbie GRANADOS [facility] ([id number]) on 2024 11:14:40 AM     T4, Free    Collection Time: 24  4:24 PM   Result Value Ref Range    T4 Free 0.9 0.8 - 1.5 ng/dL   Troponin    Collection Time: 24  4:24 PM   Result Value Ref Range    Troponin, High Sensitivity 4 0 - 51 ng/L       Assessment and Plan:   Principal Problem:    Right lower quadrant abdominal pain  Active Problems:    Right ovarian cyst    Kidney stone on right side    Fibroid uterus  Resolved Problems:    * No resolved hospital problems. *     Patient stable.  Doing well after stent placement.   Can likely be discharged .   Needs followup with Dr Elder from Gyn at [facility] to remove cyst as previously planned.      
Patient retaining 342 ml's urine per bladder scan before assuming care of patient. Patient rescanned and found 429ml's on bladder scan.   Encouragement given to patient to void to avoid need for catheterization. Patient able to void and repeat bladder scan showed 0ml's.  
Pt d/c home with sister, assisted to pv with w/c and PCT.   
12/02/24  1255 12/02/24  1511    138   K 3.8 3.7    110*   CO2 23 21   BUN 10 8   ALT 22 19       24 Hour Results:  Recent Results (from the past 24 hour(s))   T4, Free    Collection Time: 12/04/24  4:24 PM   Result Value Ref Range    T4 Free 0.9 0.8 - 1.5 ng/dL   Troponin    Collection Time: 12/04/24  4:24 PM   Result Value Ref Range    Troponin, High Sensitivity 4 0 - 51 ng/L       Assessment     Patient Active Problem List   Diagnosis    Right lower quadrant abdominal pain    Right ovarian cyst    Kidney stone    Fibroid uterus       Assessment/Plan:  RIGHT URETERAL STRICTURE: now s/p ureteral stent placement and dilation.  Follow up in 2 weeks for stent removal.    HEMATURIA: can have intermittent hematuria with stent in place; avoid strenuous exercise, avoid NSAID's for several days.    OK for discharge from URO standpoint.     Sherry Morris, APRN - NP    Please note that portions of this note were completed with Dragon dictation, the computer voice recognition software.  Quite often unanticipated grammatical, syntax, homophones, and other interpretive errors are inadvertently transcribed by the computer software.  Please disregard these errors and any other errors that may have escaped final proofreading.  Thank you.    
3.7 3.5 - 5.1 mmol/L    Chloride 110 (H) 97 - 108 mmol/L    CO2 21 21 - 32 mmol/L    Anion Gap 7 2 - 12 mmol/L    Glucose 90 65 - 100 mg/dL    BUN 8 6 - 20 mg/dL    Creatinine 0.84 0.55 - 1.02 mg/dL    BUN/Creatinine Ratio 10 (L) 12 - 20      Est, Glom Filt Rate >90 >60 ml/min/1.73m2    Calcium 8.0 (L) 8.5 - 10.1 mg/dL    Total Bilirubin 0.8 0.2 - 1.0 mg/dL    AST 8 (L) 15 - 37 U/L    ALT 19 12 - 78 U/L    Alk Phosphatase 45 45 - 117 U/L    Total Protein 6.1 (L) 6.4 - 8.2 g/dL    Albumin 3.0 (L) 3.5 - 5.0 g/dL    Globulin 3.1 2.0 - 4.0 g/dL    Albumin/Globulin Ratio 1.0 (L) 1.1 - 2.2     CBC with Auto Differential    Collection Time: 12/03/24  6:33 AM   Result Value Ref Range    WBC 8.0 3.6 - 11.0 K/uL    RBC 3.29 (L) 3.80 - 5.20 M/uL    Hemoglobin 10.0 (L) 11.5 - 16.0 g/dL    Hematocrit 30.7 (L) 35.0 - 47.0 %    MCV 93.3 80.0 - 99.0 FL    MCH 30.4 26.0 - 34.0 PG    MCHC 32.6 30.0 - 36.5 g/dL    RDW 13.2 11.5 - 14.5 %    Platelets 226 150 - 400 K/uL    MPV 9.7 8.9 - 12.9 FL    Nucleated RBCs 0.0 0.0  WBC    nRBC 0.00 0.00 - 0.01 K/uL    Neutrophils % 63 32 - 75 %    Lymphocytes % 25 12 - 49 %    Monocytes % 9 5 - 13 %    Eosinophils % 2 0 - 7 %    Basophils % 1 0 - 1 %    Immature Granulocytes % 0 0 - 0.5 %    Neutrophils Absolute 5.1 1.8 - 8.0 K/UL    Lymphocytes Absolute 2.0 0.8 - 3.5 K/UL    Monocytes Absolute 0.7 0.0 - 1.0 K/UL    Eosinophils Absolute 0.1 0.0 - 0.4 K/UL    Basophils Absolute 0.0 0.0 - 0.1 K/UL    Immature Granulocytes Absolute 0.0 0.00 - 0.04 K/UL    Differential Type AUTOMATED         CT 11/6:  KIDNEYS/URETERS: No abnormal mass, hydronephrosis, or renal calculus.   1. Large right adnexal cyst measuring 7 cm. Recommend correlation with pelvic  ultrasound.    CT 12/1:  KIDNEYS: Interval development of moderate right-sided hydronephrosis. There is a  delayed right nephrogram. Uncertain if the obstruction is due to the right  ovarian cyst or a small calcification in the right pelvis best

## 2024-12-05 NOTE — PLAN OF CARE
Problem: Pain  Goal: Verbalizes/displays adequate comfort level or baseline comfort level  Outcome: Progressing     Problem: Discharge Planning  Goal: Discharge to home or other facility with appropriate resources  Outcome: Progressing  Flowsheets (Taken 12/4/2024 2100)  Discharge to home or other facility with appropriate resources: Identify barriers to discharge with patient and caregiver     Problem: Safety - Adult  Goal: Free from fall injury  Outcome: Progressing     Problem: ABCDS Injury Assessment  Goal: Absence of physical injury  Outcome: Progressing

## 2024-12-05 NOTE — DISCHARGE SUMMARY
and contains a 6.6 x 6.3 x 6.1 cm complex cyst. No  internal blood flow. Left ovary is not seen due to bowel gas.     On transvaginal imaging uterus measures 10.7 x 5.6 x 6.7 cm. Endometrial stripe  is thickened and irregular measuring 1.9 cm. Right ovary measures 7.1 x 6.5 x  7.3 cm and contains a complex 6.8 cm cyst with no internal blood flow. Left  ovary measures 3.3 x 1.6 x 2.1 cm with normal blood flow     IMPRESSION:  1. Complex hemorrhagic cyst right ovary measures 6.8 cm     2. Endometrial stripe is thickened and irregular measuring 2.1 cm  ---------------------------  EXAM: CT ABDOMEN PELVIS W IV CONTRAST 12/1/24     INDICATION: left flank pain     COMPARISON: 11/6/2024, November 19, 2018     CONTRAST: 100 mL of Isovue-370.     ORAL CONTRAST: None     TECHNIQUE:   Following intravenous administration of contrast, thin axial images were  obtained through the abdomen and pelvis. Coronal and sagittal reconstructions  were generated. CT dose reduction was achieved through use of a standardized  protocol tailored for this examination and automatic exposure control for dose  modulation.     FINDINGS:   LOWER THORAX: No significant abnormality in the incidentally imaged lower chest.  LIVER: No mass.  BILIARY TREE: Gallbladder is within normal limits. CBD is not dilated.  SPLEEN: within normal limits.  PANCREAS: No mass or ductal dilatation.  ADRENALS: Unremarkable.  KIDNEYS: Interval development of moderate right-sided hydronephrosis. There is a  delayed right nephrogram. Uncertain if the obstruction is due to the right  ovarian cyst or a small calcification in the right pelvis best seen on axial  image 67. On previous study November 19, 2018 there was a phlebolith within the  right pelvis. The ureter cannot be followed through the pelvis. No left-sided  hydronephrosis  STOMACH: Unremarkable.  SMALL BOWEL: No dilatation or wall thickening.  COLON: Moderate amount of stool within the right colon.  APPENDIX: Not

## 2024-12-05 NOTE — PLAN OF CARE
Pt a/ox4, skin warm/dry, pt c/o soreness to abd but bearable. Ambulation independent. Pt is being d/c home, awaiting ride home. Reviewed d/c instructions, will assist to pv once ride is present.

## 2024-12-17 PROBLEM — N13.5 URETERAL STRICTURE: Status: ACTIVE | Noted: 2024-12-17

## 2024-12-19 NOTE — ANESTHESIA POSTPROCEDURE EVALUATION
Department of Anesthesiology  Postprocedure Note    Patient: Landy Kim  MRN: [id number]  YOB: 1990    Procedure Summary       Date: 12/04/24 Room / Location: [facility] MAIN OR 01 / [facility] MAIN OR    Anesthesia Start: 1325 Anesthesia Stop: 1418    Procedure: CYSTOURETHROSCOPY, RIGHT URETEROSCOPY, URETERAL DILATION AND URETERAL STENT PLACEMENT (Vagina ) Diagnosis:       Kidney stone      (Kidney stone [N20.0])    Surgeons: Chirag Nolan MD Responsible Provider: Bernard Brown MD    Anesthesia Type: general ASA Status: 3            Anesthesia Type: No value filed.    Kuldeep Phase I: Kuldeep Score: 10    Kuldeep Phase II:      Anesthesia Post Evaluation    Patient location during evaluation: PACU  Patient participation: complete - patient cannot participate  Level of consciousness: awake  Pain score: 0  Airway patency: patent  Nausea & Vomiting: no nausea and no vomiting  Cardiovascular status: hemodynamically stable  Respiratory status: acceptable  Hydration status: stable  Multimodal analgesia pain management approach        No notable events documented.

## 2024-12-20 ENCOUNTER — PROCEDURE VISIT (OUTPATIENT)
Age: 34
End: 2024-12-20

## 2024-12-20 VITALS
WEIGHT: 152 LBS | HEART RATE: 75 BPM | DIASTOLIC BLOOD PRESSURE: 74 MMHG | BODY MASS INDEX: 26.93 KG/M2 | HEIGHT: 63 IN | SYSTOLIC BLOOD PRESSURE: 107 MMHG

## 2024-12-20 DIAGNOSIS — N13.5 URETERAL STRICTURE: Primary | ICD-10-CM

## 2024-12-20 LAB
BILIRUBIN, URINE, POC: NEGATIVE
BLOOD URINE, POC: ABNORMAL
GLUCOSE URINE, POC: NEGATIVE
KETONES, URINE, POC: NEGATIVE
LEUKOCYTE ESTERASE, URINE, POC: ABNORMAL
NITRITE, URINE, POC: NEGATIVE
PH, URINE, POC: 7 (ref 4.6–8)
PROTEIN,URINE, POC: 300
SPECIFIC GRAVITY, URINE, POC: 1.02 (ref 1–1.03)
URINALYSIS CLARITY, POC: CLEAR
URINALYSIS COLOR, POC: YELLOW
UROBILINOGEN, POC: ABNORMAL

## 2024-12-20 RX ORDER — NORGESTIMATE AND ETHINYL ESTRADIOL 0.25-0.035
KIT ORAL
COMMUNITY
Start: 2024-12-12

## 2024-12-20 RX ORDER — ERGOCALCIFEROL 1.25 MG/1
CAPSULE ORAL
COMMUNITY

## 2024-12-20 NOTE — PROGRESS NOTES
Chief Complaint   Patient presents with    Cystoscopy     Stent removal     1. Have you been to the ER, urgent care clinic since your last visit?  Hospitalized since your last visit?No    2. Have you seen or consulted any other health care providers outside of the [facility] since your last visit?  Include any pap smears or colon screening. No  /74   Pulse 75   Ht 1.6 m (5' 3\")   Wt 68.9 kg (152 lb)   LMP 11/13/2024 (Approximate)   BMI 26.93 kg/m²

## 2024-12-20 NOTE — PROGRESS NOTES
CYSTOSCOPY/ STENT REMOVAL  Patient presented for cystoscopy and ureteral stent removal.  The patient was placed supine on the procedure table and the genitals were prepped and with chlorhexidine.  Using 16 French flexible cystoscope, cystourethroscopy was performed.    The urethra was patent without stricturing.    The visualized portions of the bladder mucosa was without trabeculation, tumors or concerning erythema.    There was a stent in the Right  ureter. It was grasped and withdrawn intact.      Follow-up as needed in the future.  She has an appointment with a surgeon for removal of right ovarian cyst which may be causing compression of her right ureter.

## 2024-12-21 LAB — BACTERIA UR CULT: NO GROWTH

## 2024-12-29 RX ORDER — OXYBUTYNIN CHLORIDE 10 MG/1
10 TABLET, EXTENDED RELEASE ORAL DAILY
Qty: 15 TABLET | Refills: 0 | OUTPATIENT
Start: 2024-12-29

## 2025-01-03 ENCOUNTER — TRANSCRIBE ORDERS (OUTPATIENT)
Facility: HOSPITAL | Age: 35
End: 2025-01-03

## 2025-01-03 DIAGNOSIS — N94.89 ADNEXAL MASS: Primary | ICD-10-CM

## 2025-01-09 ENCOUNTER — TRANSCRIBE ORDERS (OUTPATIENT)
Facility: HOSPITAL | Age: 35
End: 2025-01-09

## 2025-01-09 DIAGNOSIS — N94.89 ADNEXAL MASS: Primary | ICD-10-CM

## 2025-01-14 ENCOUNTER — HOSPITAL ENCOUNTER (OUTPATIENT)
Facility: HOSPITAL | Age: 35
Discharge: HOME | End: 2025-01-17
Attending: STUDENT IN AN ORGANIZED HEALTH CARE EDUCATION/TRAINING PROGRAM
Payer: MEDICAID

## 2025-01-14 ENCOUNTER — HOSPITAL ENCOUNTER (OUTPATIENT)
Facility: HOSPITAL | Age: 35
End: 2025-01-14
Attending: STUDENT IN AN ORGANIZED HEALTH CARE EDUCATION/TRAINING PROGRAM
Payer: MEDICAID

## 2025-01-14 DIAGNOSIS — N94.89 ADNEXAL MASS: ICD-10-CM

## 2025-01-14 PROCEDURE — A9577 INJ MULTIHANCE: HCPCS | Performed by: STUDENT IN AN ORGANIZED HEALTH CARE EDUCATION/TRAINING PROGRAM

## 2025-01-14 PROCEDURE — 72197 MRI PELVIS W/O & W/DYE: CPT

## 2025-01-14 PROCEDURE — 6360000004 HC RX CONTRAST MEDICATION: Performed by: STUDENT IN AN ORGANIZED HEALTH CARE EDUCATION/TRAINING PROGRAM

## 2025-01-14 RX ADMIN — GADOBENATE DIMEGLUMINE 14 ML: 529 INJECTION, SOLUTION INTRAVENOUS at 12:32

## 2025-01-16 ENCOUNTER — HOSPITAL ENCOUNTER (EMERGENCY)
Facility: HOSPITAL | Age: 35
Discharge: HOME | End: 2025-01-16
Payer: MEDICAID

## 2025-01-16 ENCOUNTER — APPOINTMENT (OUTPATIENT)
Facility: HOSPITAL | Age: 35
End: 2025-01-16
Payer: MEDICAID

## 2025-01-16 VITALS
BODY MASS INDEX: 26.75 KG/M2 | HEART RATE: 75 BPM | RESPIRATION RATE: 17 BRPM | OXYGEN SATURATION: 99 % | SYSTOLIC BLOOD PRESSURE: 117 MMHG | TEMPERATURE: 98.9 F | DIASTOLIC BLOOD PRESSURE: 82 MMHG | WEIGHT: 151 LBS | HEIGHT: 63 IN

## 2025-01-16 DIAGNOSIS — I82.611 SUPERFICIAL VENOUS THROMBOSIS OF ARM, RIGHT: Primary | ICD-10-CM

## 2025-01-16 LAB — ECHO BSA: 1.74 M2

## 2025-01-16 PROCEDURE — 93005 ELECTROCARDIOGRAM TRACING: CPT | Performed by: NURSE PRACTITIONER

## 2025-01-16 PROCEDURE — 99284 EMERGENCY DEPT VISIT MOD MDM: CPT

## 2025-01-16 PROCEDURE — 93971 EXTREMITY STUDY: CPT

## 2025-01-16 RX ORDER — HYDROCODONE BITARTRATE AND ACETAMINOPHEN 5; 325 MG/1; MG/1
1 TABLET ORAL EVERY 6 HOURS PRN
Qty: 12 TABLET | Refills: 0 | Status: SHIPPED | OUTPATIENT
Start: 2025-01-16 | End: 2025-01-19

## 2025-01-16 RX ORDER — KETOROLAC TROMETHAMINE 30 MG/ML
30 INJECTION, SOLUTION INTRAMUSCULAR; INTRAVENOUS
Status: DISCONTINUED | OUTPATIENT
Start: 2025-01-16 | End: 2025-01-16

## 2025-01-16 RX ORDER — DEXAMETHASONE SODIUM PHOSPHATE 10 MG/ML
10 INJECTION, SOLUTION INTRAMUSCULAR; INTRAVENOUS ONCE
Status: DISCONTINUED | OUTPATIENT
Start: 2025-01-16 | End: 2025-01-16

## 2025-01-16 RX ORDER — METOCLOPRAMIDE HYDROCHLORIDE 5 MG/ML
10 INJECTION INTRAMUSCULAR; INTRAVENOUS ONCE
Status: DISCONTINUED | OUTPATIENT
Start: 2025-01-16 | End: 2025-01-16

## 2025-01-16 ASSESSMENT — LIFESTYLE VARIABLES
HOW OFTEN DO YOU HAVE A DRINK CONTAINING ALCOHOL: NEVER
HOW MANY STANDARD DRINKS CONTAINING ALCOHOL DO YOU HAVE ON A TYPICAL DAY: PATIENT DOES NOT DRINK

## 2025-01-16 ASSESSMENT — PAIN - FUNCTIONAL ASSESSMENT
PAIN_FUNCTIONAL_ASSESSMENT: 0-10
PAIN_FUNCTIONAL_ASSESSMENT: 0-10

## 2025-01-16 ASSESSMENT — PAIN DESCRIPTION - LOCATION
LOCATION: ARM
LOCATION: ARM

## 2025-01-16 ASSESSMENT — PAIN SCALES - GENERAL
PAINLEVEL_OUTOF10: 8
PAINLEVEL_OUTOF10: 7

## 2025-01-16 NOTE — ED PROVIDER NOTES
with voice recognition software. Quite often unanticipated grammatical, syntax, homophones, and other interpretive errors are inadvertently transcribed by the computer software. Please disregards these errors. Please excuse any errors that have escaped final proofreading.)     Anil Eaton APRN - LISA  01/17/25 0875

## 2025-01-17 LAB
EKG ATRIAL RATE: 82 BPM
EKG DIAGNOSIS: NORMAL
EKG P AXIS: 60 DEGREES
EKG P-R INTERVAL: 176 MS
EKG Q-T INTERVAL: 378 MS
EKG QRS DURATION: 72 MS
EKG QTC CALCULATION (BAZETT): 441 MS
EKG R AXIS: 50 DEGREES
EKG T AXIS: 29 DEGREES
EKG VENTRICULAR RATE: 82 BPM

## 2025-02-03 ENCOUNTER — TRANSCRIBE ORDERS (OUTPATIENT)
Facility: HOSPITAL | Age: 35
End: 2025-02-03

## 2025-02-03 DIAGNOSIS — Z83.2 FAMILY HISTORY OF ANEMIA: ICD-10-CM

## 2025-02-03 DIAGNOSIS — R94.120 ABNORMAL AUDITORY FUNCTION STUDY: ICD-10-CM

## 2025-02-03 DIAGNOSIS — I82.90 VENOUS THROMBOSIS: Primary | ICD-10-CM

## 2025-02-03 DIAGNOSIS — I82.621 DEEP VEIN THROMBOSIS (DVT) OF RIGHT UPPER EXTREMITY, UNSPECIFIED CHRONICITY, UNSPECIFIED VEIN (HCC): ICD-10-CM

## 2025-04-13 ENCOUNTER — APPOINTMENT (OUTPATIENT)
Facility: HOSPITAL | Age: 35
End: 2025-04-13
Payer: MEDICAID

## 2025-04-13 ENCOUNTER — HOSPITAL ENCOUNTER (EMERGENCY)
Facility: HOSPITAL | Age: 35
Discharge: HOME | End: 2025-04-13
Attending: EMERGENCY MEDICINE
Payer: MEDICAID

## 2025-04-13 VITALS
SYSTOLIC BLOOD PRESSURE: 126 MMHG | OXYGEN SATURATION: 100 % | HEIGHT: 63 IN | WEIGHT: 155 LBS | RESPIRATION RATE: 16 BRPM | HEART RATE: 87 BPM | DIASTOLIC BLOOD PRESSURE: 76 MMHG | TEMPERATURE: 98.3 F | BODY MASS INDEX: 27.46 KG/M2

## 2025-04-13 DIAGNOSIS — R11.2 NAUSEA AND VOMITING, UNSPECIFIED VOMITING TYPE: Primary | ICD-10-CM

## 2025-04-13 DIAGNOSIS — R19.7 DIARRHEA, UNSPECIFIED TYPE: ICD-10-CM

## 2025-04-13 LAB
ALBUMIN SERPL-MCNC: 3.7 G/DL (ref 3.5–5)
ALBUMIN/GLOB SERPL: 1.2 (ref 1.1–2.2)
ALP SERPL-CCNC: 53 U/L (ref 45–117)
ALT SERPL-CCNC: 25 U/L (ref 12–78)
ANION GAP SERPL CALC-SCNC: 6 MMOL/L (ref 2–12)
APPEARANCE UR: CLEAR
AST SERPL W P-5'-P-CCNC: NORMAL U/L (ref 15–37)
BACTERIA URNS QL MICRO: NEGATIVE /HPF
BASOPHILS # BLD: 0.09 K/UL (ref 0–0.1)
BASOPHILS NFR BLD: 1.6 % (ref 0–1)
BILIRUB SERPL-MCNC: 0.8 MG/DL (ref 0.2–1)
BILIRUB UR QL: NEGATIVE
BUN SERPL-MCNC: 10 MG/DL (ref 6–20)
BUN/CREAT SERPL: 14 (ref 12–20)
CA-I BLD-MCNC: 9.2 MG/DL (ref 8.5–10.1)
CHLORIDE SERPL-SCNC: 106 MMOL/L (ref 97–108)
CO2 SERPL-SCNC: 27 MMOL/L (ref 21–32)
COLOR UR: ABNORMAL
CREAT SERPL-MCNC: 0.7 MG/DL (ref 0.55–1.02)
DIFFERENTIAL METHOD BLD: ABNORMAL
EOSINOPHIL # BLD: 0.24 K/UL (ref 0–0.4)
EOSINOPHIL NFR BLD: 4.2 % (ref 0–7)
EPITH CASTS URNS QL MICRO: ABNORMAL /LPF
ERYTHROCYTE [DISTWIDTH] IN BLOOD BY AUTOMATED COUNT: 13.6 % (ref 11.5–14.5)
FLUAV RNA SPEC QL NAA+PROBE: NOT DETECTED
FLUBV RNA SPEC QL NAA+PROBE: NOT DETECTED
GLOBULIN SER CALC-MCNC: 3.2 G/DL (ref 2–4)
GLUCOSE SERPL-MCNC: 91 MG/DL (ref 65–100)
GLUCOSE UR STRIP.AUTO-MCNC: NEGATIVE MG/DL
HCG SERPL QL: NEGATIVE
HCT VFR BLD AUTO: 33.2 % (ref 35–47)
HGB BLD-MCNC: 11 G/DL (ref 11.5–16)
HGB UR QL STRIP: NEGATIVE
IMM GRANULOCYTES # BLD AUTO: 0.01 K/UL (ref 0–0.04)
IMM GRANULOCYTES NFR BLD AUTO: 0.2 % (ref 0–0.5)
KETONES UR QL STRIP.AUTO: NEGATIVE MG/DL
LEUKOCYTE ESTERASE UR QL STRIP.AUTO: NEGATIVE
LIPASE SERPL-CCNC: 35 U/L (ref 13–75)
LYMPHOCYTES # BLD: 2.94 K/UL (ref 0.8–3.5)
LYMPHOCYTES NFR BLD: 51.2 % (ref 12–49)
MCH RBC QN AUTO: 28.7 PG (ref 26–34)
MCHC RBC AUTO-ENTMCNC: 33.1 G/DL (ref 30–36.5)
MCV RBC AUTO: 86.7 FL (ref 80–99)
MONOCYTES # BLD: 0.34 K/UL (ref 0–1)
MONOCYTES NFR BLD: 5.9 % (ref 5–13)
MUCOUS THREADS URNS QL MICRO: ABNORMAL /LPF
NEUTS SEG # BLD: 2.12 K/UL (ref 1.8–8)
NEUTS SEG NFR BLD: 36.9 % (ref 32–75)
NITRITE UR QL STRIP.AUTO: NEGATIVE
NRBC # BLD: 0 K/UL (ref 0–0.01)
NRBC BLD-RTO: 0 PER 100 WBC
PH UR STRIP: 7 (ref 5–8)
PLATELET # BLD AUTO: 304 K/UL (ref 150–400)
PMV BLD AUTO: 10.3 FL (ref 8.9–12.9)
POTASSIUM SERPL-SCNC: 3.5 MMOL/L (ref 3.5–5.1)
POTASSIUM SERPL-SCNC: NORMAL MMOL/L (ref 3.5–5.1)
PROT SERPL-MCNC: 6.9 G/DL (ref 6.4–8.2)
PROT UR STRIP-MCNC: NEGATIVE MG/DL
RBC # BLD AUTO: 3.83 M/UL (ref 3.8–5.2)
RBC #/AREA URNS HPF: ABNORMAL /HPF (ref 0–5)
SARS-COV-2 RNA RESP QL NAA+PROBE: NOT DETECTED
SODIUM SERPL-SCNC: 139 MMOL/L (ref 136–145)
SP GR UR REFRACTOMETRY: 1.01 (ref 1–1.03)
URINE CULTURE IF INDICATED: ABNORMAL
UROBILINOGEN UR QL STRIP.AUTO: 0.1 EU/DL (ref 0.1–1)
WBC # BLD AUTO: 5.7 K/UL (ref 3.6–11)
WBC URNS QL MICRO: ABNORMAL /HPF (ref 0–4)

## 2025-04-13 PROCEDURE — 2580000003 HC RX 258: Performed by: EMERGENCY MEDICINE

## 2025-04-13 PROCEDURE — 96374 THER/PROPH/DIAG INJ IV PUSH: CPT

## 2025-04-13 PROCEDURE — 96361 HYDRATE IV INFUSION ADD-ON: CPT

## 2025-04-13 PROCEDURE — 80053 COMPREHEN METABOLIC PANEL: CPT

## 2025-04-13 PROCEDURE — 74177 CT ABD & PELVIS W/CONTRAST: CPT

## 2025-04-13 PROCEDURE — 84132 ASSAY OF SERUM POTASSIUM: CPT

## 2025-04-13 PROCEDURE — 6360000004 HC RX CONTRAST MEDICATION: Performed by: EMERGENCY MEDICINE

## 2025-04-13 PROCEDURE — 99285 EMERGENCY DEPT VISIT HI MDM: CPT

## 2025-04-13 PROCEDURE — 84703 CHORIONIC GONADOTROPIN ASSAY: CPT

## 2025-04-13 PROCEDURE — 85025 COMPLETE CBC W/AUTO DIFF WBC: CPT

## 2025-04-13 PROCEDURE — 6360000002 HC RX W HCPCS: Performed by: EMERGENCY MEDICINE

## 2025-04-13 PROCEDURE — 36415 COLL VENOUS BLD VENIPUNCTURE: CPT

## 2025-04-13 PROCEDURE — 87636 SARSCOV2 & INF A&B AMP PRB: CPT

## 2025-04-13 PROCEDURE — 81001 URINALYSIS AUTO W/SCOPE: CPT

## 2025-04-13 PROCEDURE — 83690 ASSAY OF LIPASE: CPT

## 2025-04-13 RX ORDER — IOPAMIDOL 755 MG/ML
100 INJECTION, SOLUTION INTRAVASCULAR
Status: COMPLETED | OUTPATIENT
Start: 2025-04-13 | End: 2025-04-13

## 2025-04-13 RX ORDER — ONDANSETRON 2 MG/ML
8 INJECTION INTRAMUSCULAR; INTRAVENOUS ONCE
Status: COMPLETED | OUTPATIENT
Start: 2025-04-13 | End: 2025-04-13

## 2025-04-13 RX ORDER — 0.9 % SODIUM CHLORIDE 0.9 %
1000 INTRAVENOUS SOLUTION INTRAVENOUS
Status: COMPLETED | OUTPATIENT
Start: 2025-04-13 | End: 2025-04-13

## 2025-04-13 RX ORDER — ONDANSETRON 4 MG/1
4 TABLET, ORALLY DISINTEGRATING ORAL 3 TIMES DAILY PRN
Qty: 21 TABLET | Refills: 0 | Status: SHIPPED | OUTPATIENT
Start: 2025-04-13

## 2025-04-13 RX ADMIN — ONDANSETRON 8 MG: 2 INJECTION, SOLUTION INTRAMUSCULAR; INTRAVENOUS at 18:12

## 2025-04-13 RX ADMIN — SODIUM CHLORIDE 1000 ML: 0.9 INJECTION, SOLUTION INTRAVENOUS at 18:11

## 2025-04-13 RX ADMIN — IOPAMIDOL 100 ML: 755 INJECTION, SOLUTION INTRAVENOUS at 18:46

## 2025-04-13 ASSESSMENT — PAIN SCALES - GENERAL
PAINLEVEL_OUTOF10: 6
PAINLEVEL_OUTOF10: 8

## 2025-04-13 ASSESSMENT — PAIN - FUNCTIONAL ASSESSMENT: PAIN_FUNCTIONAL_ASSESSMENT: 0-10

## 2025-04-13 NOTE — ED PROVIDER NOTES
[facility] EMERGENCY DEPT  EMERGENCY DEPARTMENT HISTORY AND PHYSICAL EXAM      Date of evaluation: 4/13/2025  Patient Name: Landy Kim  Birthdate 1990  MRN: [id number]  ED Provider: Chirag Greene MD   Note Started: 6:03 PM EDT 4/13/25    HISTORY OF PRESENT ILLNESS     Chief Complaint   Patient presents with    Abdominal Pain    Nausea       History Provided By: Patient, only     HPI: Landy Kim is a 35 y.o. female presents concern history of elevated CA 19 and D-dimer sees hematology oncology and is seeing Vi Brown due to recent laparoscopy in March for ovarian masses which were found to be benign.  She was on a blood thinner from December through that time for basilic right upper extremity blood clot told superficial did not need blood thinners anymore has been off of them since laparoscopy March.  Last 4 days she has had nonbloody nausea vomiting diarrhea and epigastric pain she is worried about cancer possible pancreatic masses.  Also endorses congestion sore throat.    PAST MEDICAL HISTORY   Past Medical History:  Past Medical History:   Diagnosis Date    Arnold-Chiari malformation (HCC)     Endometriosis     GERD (gastroesophageal reflux disease)     Hashimoto's disease     KEYUR (obstructive sleep apnea)     Scoliosis     Seasonal allergic rhinitis        Past Surgical History:  Past Surgical History:   Procedure Laterality Date    APPENDECTOMY      CYSTOSCOPY N/A 12/04/2024    CYSTOURETHROSCOPY, RIGHT URETEROSCOPY, URETERAL DILATION AND URETERAL STENT PLACEMENT performed by Chirag Nolan MD at [facility] MAIN OR    CYSTOSCOPY  12/20/2024    Stent Removal    TONSILLECTOMY         Family History:  Family History   Problem Relation Age of Onset    Lupus Mother     Cancer Other         father's uncles - stomach cancer       Social History:  Social History     Tobacco Use    Smoking status: Former     Current packs/day: 0.25     Types: Cigarettes    Smokeless tobacco: Never   Vaping Use    Vaping

## 2025-04-14 NOTE — DISCHARGE INSTRUCTIONS
1.02 mg/dL    BUN/Creatinine Ratio 14 12 - 20      Est, Glom Filt Rate >90 >60 ml/min/1.73m2    Calcium 9.2 8.5 - 10.1 mg/dL    Total Bilirubin 0.8 0.2 - 1.0 mg/dL    AST Hemolyzed, Recollection Recommended 15 - 37 U/L    ALT 25 12 - 78 U/L    Alk Phosphatase 53 45 - 117 U/L    Total Protein 6.9 6.4 - 8.2 g/dL    Albumin 3.7 3.5 - 5.0 g/dL    Globulin 3.2 2.0 - 4.0 g/dL    Albumin/Globulin Ratio 1.2 1.1 - 2.2     Lipase    Collection Time: 04/13/25  5:16 PM   Result Value Ref Range    Lipase 35 13 - 75 U/L   HCG Qualitative, Serum    Collection Time: 04/13/25  5:16 PM   Result Value Ref Range    Preg, Serum Negative Negative     COVID-19 & Influenza Combo    Collection Time: 04/13/25  6:18 PM    Specimen: Nasopharyngeal   Result Value Ref Range    SARS-CoV-2, PCR Not Detected Not Detected      Rapid Influenza A By PCR Not Detected Not Detected      Rapid Influenza B By PCR Not Detected Not Detected     Urinalysis with Reflex to Culture    Collection Time: 04/13/25  6:19 PM    Specimen: Urine   Result Value Ref Range    Color, UA Yellow/Straw      Appearance Clear Clear      Specific Gravity, UA 1.014 1.003 - 1.030      pH, Urine 7.0 5.0 - 8.0      Protein, UA Negative Negative mg/dL    Glucose, Ur Negative Negative mg/dL    Ketones, Urine Negative Negative mg/dL    Bilirubin, Urine Negative Negative      Blood, Urine Negative Negative      Urobilinogen, Urine 0.1 0.1 - 1.0 EU/dL    Nitrite, Urine Negative Negative      Leukocyte Esterase, Urine Negative Negative      WBC, UA 0-4 0 - 4 /hpf    RBC, UA 0-5 0 - 5 /hpf    Epithelial Cells, UA Few Few /lpf    BACTERIA, URINE Negative Negative /hpf    Urine Culture if Indicated Culture not indicated by UA result Culture not indicated by UA result      Mucus, UA Trace (A) Negative /lpf   Potassium    Collection Time: 04/13/25  6:26 PM   Result Value Ref Range    Potassium 3.5 3.5 - 5.1 mmol/L     CT ABDOMEN PELVIS W IV CONTRAST Additional Contrast? None  Result Date:

## 2025-06-19 ENCOUNTER — HOSPITAL ENCOUNTER (EMERGENCY)
Facility: HOSPITAL | Age: 35
Discharge: HOME | End: 2025-06-19
Payer: MEDICAID

## 2025-06-19 ENCOUNTER — APPOINTMENT (OUTPATIENT)
Facility: HOSPITAL | Age: 35
End: 2025-06-19
Payer: MEDICAID

## 2025-06-19 VITALS
HEIGHT: 63 IN | WEIGHT: 155 LBS | HEART RATE: 91 BPM | RESPIRATION RATE: 18 BRPM | OXYGEN SATURATION: 100 % | BODY MASS INDEX: 27.46 KG/M2 | DIASTOLIC BLOOD PRESSURE: 67 MMHG | SYSTOLIC BLOOD PRESSURE: 112 MMHG | TEMPERATURE: 99.1 F

## 2025-06-19 DIAGNOSIS — I82.4Y1 ACUTE DEEP VEIN THROMBOSIS (DVT) OF PROXIMAL VEIN OF RIGHT LOWER EXTREMITY (HCC): Primary | ICD-10-CM

## 2025-06-19 LAB — ECHO BSA: 1.77 M2

## 2025-06-19 PROCEDURE — 93971 EXTREMITY STUDY: CPT

## 2025-06-19 PROCEDURE — 99284 EMERGENCY DEPT VISIT MOD MDM: CPT

## 2025-06-19 PROCEDURE — 6370000000 HC RX 637 (ALT 250 FOR IP)

## 2025-06-19 RX ORDER — OXYCODONE HYDROCHLORIDE 5 MG/1
5 TABLET ORAL EVERY 8 HOURS PRN
Qty: 9 TABLET | Refills: 0 | Status: SHIPPED | OUTPATIENT
Start: 2025-06-19 | End: 2025-06-22

## 2025-06-19 RX ORDER — ACETAMINOPHEN 500 MG
1000 TABLET ORAL
Status: COMPLETED | OUTPATIENT
Start: 2025-06-19 | End: 2025-06-19

## 2025-06-19 RX ADMIN — APIXABAN 10 MG: 5 TABLET, FILM COATED ORAL at 17:31

## 2025-06-19 RX ADMIN — ACETAMINOPHEN 1000 MG: 500 TABLET ORAL at 17:31

## 2025-06-19 ASSESSMENT — PAIN SCALES - GENERAL: PAINLEVEL_OUTOF10: 6

## 2025-06-19 ASSESSMENT — PAIN - FUNCTIONAL ASSESSMENT: PAIN_FUNCTIONAL_ASSESSMENT: 0-10

## 2025-06-19 NOTE — DISCHARGE INSTRUCTIONS
Take 2 tablets (10mg) twice a day for 7 days, then 1 tablet (5mg) twice a day.   Please follow up with your hematologist as soon as possible.  Return to the ER if any worsening pain, numbness, chest pain, shortness of breath.        Thank you for choosing our Emergency Department for your care.  It is our privilege to care for you in your time of need.  In the next several days, you may receive a survey via email or mailed to your home about your experience with our team.  We would greatly appreciate you taking a few minutes to complete the survey, as we use this information to learn what we have done well and what we could be doing better. Thank you for trusting us with your care!    Below you will find a list of your tests from today's visit.   Labs and Radiology Studies  Recent Results (from the past 12 hours)   Vascular duplex lower extremity venous right    Collection Time: 06/19/25  4:28 PM   Result Value Ref Range    Body Surface Area 1.77 m2     No results found.  ------------------------------------------------------------------------------------------------------------  The evaluation and treatment you received in the Emergency Department were for an urgent problem. It is important that you follow-up with a doctor, nurse practitioner, or physician assistant to:  (1) confirm your diagnosis,  (2) re-evaluation of changes in your illness and treatment, and (3) for ongoing care. Please take your discharge instructions with you when you go to your follow-up appointment.     If you have any problem arranging a follow-up appointment, contact us!  If your symptoms become worse or you do not improve as expected, please return to us. We are available 24 hours a day.     If a prescription has been provided, please fill it as soon as possible to prevent a delay in treatment. If you have any questions or reservations about taking the medication due to side effects or interactions with other medications, please call

## 2025-06-19 NOTE — ED PROVIDER NOTES
[facility] EMERGENCY DEPT  EMERGENCY DEPARTMENT HISTORY AND PHYSICAL EXAM      Date of evaluation: 6/19/2025  Patient Name: Ladny Kim  Birthdate 1990  MRN: [id number]  ED Provider: Debora Ugalde MD   Note Started: 5:44 PM EDT 6/19/25    HISTORY OF PRESENT ILLNESS     Chief Complaint   Patient presents with    Leg Pain       History Provided By: Patient     HPI: Landy Kim is a 35 y.o. female with past medical history as reviewed below who presents with right lower extremity pain and cramping.  Patient has a history of prior superficial DVT in the right upper extremity, has been on Eliquis, is not on any anticoagulation currently.  She denies any chest pain or shortness of breath.  She has been seen by hematology for this, states that she has tested negative for clotting disorders.    PAST MEDICAL HISTORY   Past Medical History:  Past Medical History:   Diagnosis Date    Arnold-Chiari malformation (HCC)     Endometriosis     GERD (gastroesophageal reflux disease)     Hashimoto's disease     KEYUR (obstructive sleep apnea)     Scoliosis     Seasonal allergic rhinitis        Past Surgical History:  Past Surgical History:   Procedure Laterality Date    APPENDECTOMY      CYSTOSCOPY N/A 12/04/2024    CYSTOURETHROSCOPY, RIGHT URETEROSCOPY, URETERAL DILATION AND URETERAL STENT PLACEMENT performed by Chirag Nolan MD at [facility] MAIN OR    CYSTOSCOPY  12/20/2024    Stent Removal    TONSILLECTOMY         Family History:  Family History   Problem Relation Age of Onset    Lupus Mother     Cancer Other         father's uncles - stomach cancer       Social History:  Social History     Tobacco Use    Smoking status: Former     Current packs/day: 0.25     Types: Cigarettes    Smokeless tobacco: Never   Vaping Use    Vaping status: Never Used   Substance Use Topics    Alcohol use: Not Currently    Drug use: Never       Allergies:  Allergies   Allergen Reactions    Barley Grass Other (See Comments)     Swelling

## 2025-06-19 NOTE — ED TRIAGE NOTES
Hx of superficial blood clots in past, reports constant ache in rt calf. Concerned she may have DVT. Pt on blood thinners in past.

## 2025-06-24 ENCOUNTER — HOSPITAL ENCOUNTER (EMERGENCY)
Facility: HOSPITAL | Age: 35
Discharge: HOME | End: 2025-06-24
Payer: MEDICAID

## 2025-06-24 VITALS
HEART RATE: 84 BPM | WEIGHT: 150 LBS | DIASTOLIC BLOOD PRESSURE: 75 MMHG | OXYGEN SATURATION: 99 % | HEIGHT: 63 IN | SYSTOLIC BLOOD PRESSURE: 126 MMHG | RESPIRATION RATE: 17 BRPM | TEMPERATURE: 98.8 F | BODY MASS INDEX: 26.58 KG/M2

## 2025-06-24 DIAGNOSIS — D64.9 ANEMIA, UNSPECIFIED TYPE: ICD-10-CM

## 2025-06-24 DIAGNOSIS — R51.9 NONINTRACTABLE HEADACHE, UNSPECIFIED CHRONICITY PATTERN, UNSPECIFIED HEADACHE TYPE: ICD-10-CM

## 2025-06-24 DIAGNOSIS — G56.01 CARPAL TUNNEL SYNDROME OF RIGHT WRIST: Primary | ICD-10-CM

## 2025-06-24 LAB
ALBUMIN SERPL-MCNC: 3.6 G/DL (ref 3.5–5)
ALBUMIN/GLOB SERPL: 1 (ref 1.1–2.2)
ALP SERPL-CCNC: 54 U/L (ref 45–117)
ALT SERPL-CCNC: 22 U/L (ref 12–78)
ANION GAP SERPL CALC-SCNC: 9 MMOL/L (ref 2–12)
AST SERPL W P-5'-P-CCNC: 14 U/L (ref 15–37)
BASOPHILS # BLD: 0.05 K/UL (ref 0–0.1)
BASOPHILS NFR BLD: 1.4 % (ref 0–1)
BILIRUB SERPL-MCNC: 0.5 MG/DL (ref 0.2–1)
BUN SERPL-MCNC: 10 MG/DL (ref 6–20)
BUN/CREAT SERPL: 12 (ref 12–20)
CA-I BLD-MCNC: 9.2 MG/DL (ref 8.5–10.1)
CHLORIDE SERPL-SCNC: 107 MMOL/L (ref 97–108)
CO2 SERPL-SCNC: 26 MMOL/L (ref 21–32)
CREAT SERPL-MCNC: 0.82 MG/DL (ref 0.55–1.02)
DIFFERENTIAL METHOD BLD: ABNORMAL
EOSINOPHIL # BLD: 0.21 K/UL (ref 0–0.4)
EOSINOPHIL NFR BLD: 6 % (ref 0–7)
ERYTHROCYTE [DISTWIDTH] IN BLOOD BY AUTOMATED COUNT: 14.1 % (ref 11.5–14.5)
GLOBULIN SER CALC-MCNC: 3.5 G/DL (ref 2–4)
GLUCOSE SERPL-MCNC: 106 MG/DL (ref 65–100)
HCT VFR BLD AUTO: 34.5 % (ref 35–47)
HGB BLD-MCNC: 10.7 G/DL (ref 11.5–16)
IMM GRANULOCYTES # BLD AUTO: 0.01 K/UL (ref 0–0.04)
IMM GRANULOCYTES NFR BLD AUTO: 0.3 % (ref 0–0.5)
LYMPHOCYTES # BLD: 1.75 K/UL (ref 0.8–3.5)
LYMPHOCYTES NFR BLD: 50 % (ref 12–49)
MAGNESIUM SERPL-MCNC: 1.9 MG/DL (ref 1.6–2.4)
MCH RBC QN AUTO: 26.7 PG (ref 26–34)
MCHC RBC AUTO-ENTMCNC: 31 G/DL (ref 30–36.5)
MCV RBC AUTO: 86 FL (ref 80–99)
MONOCYTES # BLD: 0.2 K/UL (ref 0–1)
MONOCYTES NFR BLD: 5.7 % (ref 5–13)
NEUTS SEG # BLD: 1.28 K/UL (ref 1.8–8)
NEUTS SEG NFR BLD: 36.6 % (ref 32–75)
PLATELET # BLD AUTO: 344 K/UL (ref 150–400)
PMV BLD AUTO: 9.4 FL (ref 8.9–12.9)
POTASSIUM SERPL-SCNC: 4.2 MMOL/L (ref 3.5–5.1)
PROT SERPL-MCNC: 7.1 G/DL (ref 6.4–8.2)
RBC # BLD AUTO: 4.01 M/UL (ref 3.8–5.2)
SODIUM SERPL-SCNC: 142 MMOL/L (ref 136–145)
WBC # BLD AUTO: 3.5 K/UL (ref 3.6–11)

## 2025-06-24 PROCEDURE — 96374 THER/PROPH/DIAG INJ IV PUSH: CPT

## 2025-06-24 PROCEDURE — 2580000003 HC RX 258: Performed by: PHYSICIAN ASSISTANT

## 2025-06-24 PROCEDURE — 96375 TX/PRO/DX INJ NEW DRUG ADDON: CPT

## 2025-06-24 PROCEDURE — 80053 COMPREHEN METABOLIC PANEL: CPT

## 2025-06-24 PROCEDURE — 6360000002 HC RX W HCPCS: Performed by: PHYSICIAN ASSISTANT

## 2025-06-24 PROCEDURE — 85025 COMPLETE CBC W/AUTO DIFF WBC: CPT

## 2025-06-24 PROCEDURE — 36415 COLL VENOUS BLD VENIPUNCTURE: CPT

## 2025-06-24 PROCEDURE — 83735 ASSAY OF MAGNESIUM: CPT

## 2025-06-24 PROCEDURE — 99284 EMERGENCY DEPT VISIT MOD MDM: CPT

## 2025-06-24 PROCEDURE — 2500000003 HC RX 250 WO HCPCS: Performed by: PHYSICIAN ASSISTANT

## 2025-06-24 RX ORDER — BUTALBITAL, ACETAMINOPHEN AND CAFFEINE 50; 325; 40 MG/1; MG/1; MG/1
1 TABLET ORAL EVERY 4 HOURS PRN
Qty: 20 TABLET | Refills: 0 | Status: SHIPPED | OUTPATIENT
Start: 2025-06-24

## 2025-06-24 RX ORDER — DICLOFENAC SODIUM 75 MG/1
75 TABLET, DELAYED RELEASE ORAL 2 TIMES DAILY
Qty: 20 TABLET | Refills: 0 | Status: SHIPPED | OUTPATIENT
Start: 2025-06-24

## 2025-06-24 RX ORDER — ONDANSETRON 4 MG/1
4 TABLET, ORALLY DISINTEGRATING ORAL EVERY 8 HOURS PRN
Qty: 12 TABLET | Refills: 0 | Status: SHIPPED | OUTPATIENT
Start: 2025-06-24

## 2025-06-24 RX ORDER — PREDNISONE 20 MG/1
40 TABLET ORAL DAILY
Qty: 10 TABLET | Refills: 0 | Status: SHIPPED | OUTPATIENT
Start: 2025-06-24 | End: 2025-06-29

## 2025-06-24 RX ORDER — KETOROLAC TROMETHAMINE 30 MG/ML
30 INJECTION, SOLUTION INTRAMUSCULAR; INTRAVENOUS
Status: COMPLETED | OUTPATIENT
Start: 2025-06-24 | End: 2025-06-24

## 2025-06-24 RX ORDER — ONDANSETRON 2 MG/ML
4 INJECTION INTRAMUSCULAR; INTRAVENOUS ONCE
Status: COMPLETED | OUTPATIENT
Start: 2025-06-24 | End: 2025-06-24

## 2025-06-24 RX ORDER — 0.9 % SODIUM CHLORIDE 0.9 %
500 INTRAVENOUS SOLUTION INTRAVENOUS
Status: COMPLETED | OUTPATIENT
Start: 2025-06-24 | End: 2025-06-24

## 2025-06-24 RX ADMIN — KETOROLAC TROMETHAMINE 30 MG: 30 INJECTION, SOLUTION INTRAMUSCULAR at 11:19

## 2025-06-24 RX ADMIN — SODIUM CHLORIDE 500 ML: 0.9 INJECTION, SOLUTION INTRAVENOUS at 11:19

## 2025-06-24 RX ADMIN — METHYLPREDNISOLONE SODIUM SUCCINATE 125 MG: 125 INJECTION INTRAMUSCULAR; INTRAVENOUS at 11:19

## 2025-06-24 RX ADMIN — ONDANSETRON 4 MG: 2 INJECTION, SOLUTION INTRAMUSCULAR; INTRAVENOUS at 11:18

## 2025-06-24 NOTE — ED TRIAGE NOTES
+ DVT Right lower on Thurs, seen here  Has follow up appt 7-7-25,  CC  Right arm feels asleep from hand to mid upper arm  Fingers cold right hand  Went to pt 1st yesterday for the same  Fatigue  Loose stool yesterday,  LLQ abd pain yesterday not today  Bilat Radial pulses 2+  Bilat hands equal temp, all fingers both hands cool not cold, PMS good

## 2025-06-24 NOTE — ED PROVIDER NOTES
IMITREX     * SUMAtriptan Succinate Refill 6 MG/0.5ML Soct     ZyrTEC Allergy 10 MG Caps  Generic drug: Cetirizine HCl           * This list has 4 medication(s) that are the same as other medications prescribed for you. Read the directions carefully, and ask your doctor or other care provider to review them with you.                   Where to Get Your Medications        These medications were sent to [address] [phone] -  [phone]  [address] [place] [address]      Phone: [phone]   butalbital-acetaminophen-caffeine -40 MG per tablet  diclofenac 75 MG EC tablet  ondansetron 4 MG disintegrating tablet  predniSONE 20 MG tablet           DISCONTINUED MEDICATIONS:  Discharge Medication List as of 6/24/2025 12:08 PM          I am the Primary Clinician of Record. Alfonso Davis PA-C (electronically signed)    (Please note that parts of this dictation were completed with voice recognition software. Quite often unanticipated grammatical, syntax, homophones, and other interpretive errors are inadvertently transcribed by the computer software. Please disregards these errors. Please excuse any errors that have escaped final proofreading.)     Alfonso Davis PA-C  06/24/25 [id number]

## 2025-08-29 ENCOUNTER — HOSPITAL ENCOUNTER (EMERGENCY)
Facility: HOSPITAL | Age: 35
Discharge: HOME | End: 2025-08-29
Attending: EMERGENCY MEDICINE
Payer: MEDICAID

## 2025-08-29 VITALS
BODY MASS INDEX: 26.58 KG/M2 | HEIGHT: 63 IN | WEIGHT: 150 LBS | DIASTOLIC BLOOD PRESSURE: 78 MMHG | RESPIRATION RATE: 18 BRPM | TEMPERATURE: 98.8 F | SYSTOLIC BLOOD PRESSURE: 139 MMHG | HEART RATE: 96 BPM | OXYGEN SATURATION: 100 %

## 2025-08-29 DIAGNOSIS — M27.3 DRY TOOTH SOCKET: Primary | ICD-10-CM

## 2025-08-29 DIAGNOSIS — K08.89 PAIN, DENTAL: ICD-10-CM

## 2025-08-29 PROCEDURE — 99284 EMERGENCY DEPT VISIT MOD MDM: CPT

## 2025-08-29 PROCEDURE — 6360000002 HC RX W HCPCS: Performed by: EMERGENCY MEDICINE

## 2025-08-29 PROCEDURE — 96372 THER/PROPH/DIAG INJ SC/IM: CPT

## 2025-08-29 PROCEDURE — 6370000000 HC RX 637 (ALT 250 FOR IP): Performed by: EMERGENCY MEDICINE

## 2025-08-29 RX ORDER — HYDROMORPHONE HYDROCHLORIDE 1 MG/ML
1 INJECTION, SOLUTION INTRAMUSCULAR; INTRAVENOUS; SUBCUTANEOUS
Status: COMPLETED | OUTPATIENT
Start: 2025-08-29 | End: 2025-08-29

## 2025-08-29 RX ORDER — CLINDAMYCIN HYDROCHLORIDE 150 MG/1
450 CAPSULE ORAL
Status: COMPLETED | OUTPATIENT
Start: 2025-08-29 | End: 2025-08-29

## 2025-08-29 RX ORDER — OXYCODONE HYDROCHLORIDE 5 MG/1
5 TABLET ORAL EVERY 4 HOURS PRN
Qty: 20 TABLET | Refills: 0 | Status: SHIPPED | OUTPATIENT
Start: 2025-08-29 | End: 2025-09-02

## 2025-08-29 RX ORDER — LIDOCAINE HYDROCHLORIDE 20 MG/ML
15 SOLUTION OROPHARYNGEAL
Status: COMPLETED | OUTPATIENT
Start: 2025-08-29 | End: 2025-08-29

## 2025-08-29 RX ORDER — CLINDAMYCIN HYDROCHLORIDE 150 MG/1
450 CAPSULE ORAL 3 TIMES DAILY
Qty: 63 CAPSULE | Refills: 0 | Status: SHIPPED | OUTPATIENT
Start: 2025-08-29 | End: 2025-09-05

## 2025-08-29 RX ORDER — DIPHENHYDRAMINE HCL 12.5MG/5ML
12.5 LIQUID (ML) ORAL
Status: COMPLETED | OUTPATIENT
Start: 2025-08-29 | End: 2025-08-29

## 2025-08-29 RX ADMIN — LIDOCAINE HYDROCHLORIDE 15 ML: 20 SOLUTION ORAL at 18:19

## 2025-08-29 RX ADMIN — DIPHENHYDRAMINE HYDROCHLORIDE 12.5 MG: 25 SOLUTION ORAL at 18:19

## 2025-08-29 RX ADMIN — BENZOCAINE, BUTAMBEN, AND TETRACAINE HYDROCHLORIDE 1 SPRAY: .028; .004; .004 AEROSOL, SPRAY TOPICAL at 18:19

## 2025-08-29 RX ADMIN — CLINDAMYCIN HYDROCHLORIDE 450 MG: 150 CAPSULE ORAL at 18:19

## 2025-08-29 RX ADMIN — HYDROMORPHONE HYDROCHLORIDE 1 MG: 1 INJECTION, SOLUTION INTRAMUSCULAR; INTRAVENOUS; SUBCUTANEOUS at 18:19

## 2025-08-29 ASSESSMENT — PAIN - FUNCTIONAL ASSESSMENT: PAIN_FUNCTIONAL_ASSESSMENT: 0-10

## 2025-08-29 ASSESSMENT — PAIN SCALES - GENERAL: PAINLEVEL_OUTOF10: 10

## 2025-09-01 ENCOUNTER — HOSPITAL ENCOUNTER (EMERGENCY)
Facility: HOSPITAL | Age: 35
Discharge: HOME | End: 2025-09-01

## 2025-09-01 ENCOUNTER — APPOINTMENT (OUTPATIENT)
Facility: HOSPITAL | Age: 35
End: 2025-09-01

## 2025-09-01 VITALS
RESPIRATION RATE: 18 BRPM | TEMPERATURE: 98.5 F | HEIGHT: 63 IN | BODY MASS INDEX: 26.58 KG/M2 | HEART RATE: 90 BPM | OXYGEN SATURATION: 100 % | DIASTOLIC BLOOD PRESSURE: 73 MMHG | WEIGHT: 150 LBS | SYSTOLIC BLOOD PRESSURE: 121 MMHG

## 2025-09-01 DIAGNOSIS — K05.219 PERIODONTAL ABSCESS: Primary | ICD-10-CM

## 2025-09-01 LAB
ALBUMIN SERPL-MCNC: 4 G/DL (ref 3.5–5)
ALBUMIN/GLOB SERPL: 1.2 (ref 1.1–2.2)
ALP SERPL-CCNC: 53 U/L (ref 45–117)
ALT SERPL-CCNC: 20 U/L (ref 12–78)
ANION GAP SERPL CALC-SCNC: 7 MMOL/L (ref 2–12)
AST SERPL W P-5'-P-CCNC: 9 U/L (ref 15–37)
BASOPHILS # BLD: 0.06 K/UL (ref 0–0.1)
BASOPHILS NFR BLD: 0.9 % (ref 0–1)
BILIRUB SERPL-MCNC: 0.7 MG/DL (ref 0.2–1)
BUN SERPL-MCNC: 8 MG/DL (ref 6–20)
BUN/CREAT SERPL: 10 (ref 12–20)
CA-I BLD-MCNC: 9.6 MG/DL (ref 8.5–10.1)
CHLORIDE SERPL-SCNC: 106 MMOL/L (ref 97–108)
CO2 SERPL-SCNC: 26 MMOL/L (ref 21–32)
CREAT SERPL-MCNC: 0.78 MG/DL (ref 0.55–1.02)
DIFFERENTIAL METHOD BLD: ABNORMAL
EOSINOPHIL # BLD: 0.14 K/UL (ref 0–0.4)
EOSINOPHIL NFR BLD: 2.2 % (ref 0–7)
ERYTHROCYTE [DISTWIDTH] IN BLOOD BY AUTOMATED COUNT: 15.6 % (ref 11.5–14.5)
GLOBULIN SER CALC-MCNC: 3.4 G/DL (ref 2–4)
GLUCOSE SERPL-MCNC: 90 MG/DL (ref 65–100)
HCT VFR BLD AUTO: 34.6 % (ref 35–47)
HGB BLD-MCNC: 11.3 G/DL (ref 11.5–16)
IMM GRANULOCYTES # BLD AUTO: 0.01 K/UL (ref 0–0.04)
IMM GRANULOCYTES NFR BLD AUTO: 0.2 % (ref 0–0.5)
LACTATE SERPL-SCNC: 1.4 MMOL/L (ref 0.4–2)
LYMPHOCYTES # BLD: 2.01 K/UL (ref 0.8–3.5)
LYMPHOCYTES NFR BLD: 31.6 % (ref 12–49)
MCH RBC QN AUTO: 27.4 PG (ref 26–34)
MCHC RBC AUTO-ENTMCNC: 32.7 G/DL (ref 30–36.5)
MCV RBC AUTO: 83.8 FL (ref 80–99)
MONOCYTES # BLD: 0.36 K/UL (ref 0–1)
MONOCYTES NFR BLD: 5.7 % (ref 5–13)
NEUTS SEG # BLD: 3.78 K/UL (ref 1.8–8)
NEUTS SEG NFR BLD: 59.4 % (ref 32–75)
NRBC # BLD: 0 K/UL (ref 0–0.01)
NRBC BLD-RTO: 0 PER 100 WBC
PLATELET # BLD AUTO: 362 K/UL (ref 150–400)
PMV BLD AUTO: 9.8 FL (ref 8.9–12.9)
POTASSIUM SERPL-SCNC: 3.3 MMOL/L (ref 3.5–5.1)
PROCALCITONIN SERPL-MCNC: <0.05 NG/ML
PROT SERPL-MCNC: 7.4 G/DL (ref 6.4–8.2)
RBC # BLD AUTO: 4.13 M/UL (ref 3.8–5.2)
SODIUM SERPL-SCNC: 139 MMOL/L (ref 136–145)
WBC # BLD AUTO: 6.4 K/UL (ref 3.6–11)

## 2025-09-01 PROCEDURE — 36415 COLL VENOUS BLD VENIPUNCTURE: CPT

## 2025-09-01 PROCEDURE — 70487 CT MAXILLOFACIAL W/DYE: CPT

## 2025-09-01 PROCEDURE — 2580000003 HC RX 258

## 2025-09-01 PROCEDURE — 80053 COMPREHEN METABOLIC PANEL: CPT

## 2025-09-01 PROCEDURE — 96376 TX/PRO/DX INJ SAME DRUG ADON: CPT

## 2025-09-01 PROCEDURE — 99285 EMERGENCY DEPT VISIT HI MDM: CPT

## 2025-09-01 PROCEDURE — 96375 TX/PRO/DX INJ NEW DRUG ADDON: CPT

## 2025-09-01 PROCEDURE — 96365 THER/PROPH/DIAG IV INF INIT: CPT

## 2025-09-01 PROCEDURE — 85025 COMPLETE CBC W/AUTO DIFF WBC: CPT

## 2025-09-01 PROCEDURE — 6360000004 HC RX CONTRAST MEDICATION

## 2025-09-01 PROCEDURE — 2500000003 HC RX 250 WO HCPCS

## 2025-09-01 PROCEDURE — 6360000002 HC RX W HCPCS

## 2025-09-01 PROCEDURE — 84145 PROCALCITONIN (PCT): CPT

## 2025-09-01 PROCEDURE — 96366 THER/PROPH/DIAG IV INF ADDON: CPT

## 2025-09-01 PROCEDURE — 83605 ASSAY OF LACTIC ACID: CPT

## 2025-09-01 RX ORDER — HYDROMORPHONE HYDROCHLORIDE 1 MG/ML
0.5 INJECTION, SOLUTION INTRAMUSCULAR; INTRAVENOUS; SUBCUTANEOUS
Status: COMPLETED | OUTPATIENT
Start: 2025-09-01 | End: 2025-09-01

## 2025-09-01 RX ORDER — ONDANSETRON 2 MG/ML
4 INJECTION INTRAMUSCULAR; INTRAVENOUS ONCE
Status: COMPLETED | OUTPATIENT
Start: 2025-09-01 | End: 2025-09-01

## 2025-09-01 RX ORDER — HYDROMORPHONE HYDROCHLORIDE 1 MG/ML
1 INJECTION, SOLUTION INTRAMUSCULAR; INTRAVENOUS; SUBCUTANEOUS
Status: COMPLETED | OUTPATIENT
Start: 2025-09-01 | End: 2025-09-01

## 2025-09-01 RX ORDER — IOPAMIDOL 755 MG/ML
100 INJECTION, SOLUTION INTRAVASCULAR
Status: COMPLETED | OUTPATIENT
Start: 2025-09-01 | End: 2025-09-01

## 2025-09-01 RX ADMIN — HYDROMORPHONE HYDROCHLORIDE 0.5 MG: 1 INJECTION, SOLUTION INTRAMUSCULAR; INTRAVENOUS; SUBCUTANEOUS at 19:53

## 2025-09-01 RX ADMIN — SODIUM CHLORIDE 3000 MG: 9 INJECTION, SOLUTION INTRAVENOUS at 21:52

## 2025-09-01 RX ADMIN — IOPAMIDOL 100 ML: 755 INJECTION, SOLUTION INTRAVENOUS at 16:54

## 2025-09-01 RX ADMIN — ONDANSETRON 4 MG: 2 INJECTION, SOLUTION INTRAMUSCULAR; INTRAVENOUS at 16:35

## 2025-09-01 RX ADMIN — ONDANSETRON 4 MG: 2 INJECTION, SOLUTION INTRAMUSCULAR; INTRAVENOUS at 22:23

## 2025-09-01 RX ADMIN — HYDROMORPHONE HYDROCHLORIDE 1 MG: 1 INJECTION, SOLUTION INTRAMUSCULAR; INTRAVENOUS; SUBCUTANEOUS at 16:36

## 2025-09-01 RX ADMIN — SODIUM CHLORIDE 3000 MG: 9 INJECTION, SOLUTION INTRAVENOUS at 17:22

## 2025-09-01 ASSESSMENT — PAIN SCALES - GENERAL
PAINLEVEL_OUTOF10: 3
PAINLEVEL_OUTOF10: 9

## 2025-09-01 ASSESSMENT — PAIN - FUNCTIONAL ASSESSMENT: PAIN_FUNCTIONAL_ASSESSMENT: 0-10
